# Patient Record
Sex: FEMALE | Race: WHITE | NOT HISPANIC OR LATINO | Employment: UNEMPLOYED | URBAN - METROPOLITAN AREA
[De-identification: names, ages, dates, MRNs, and addresses within clinical notes are randomized per-mention and may not be internally consistent; named-entity substitution may affect disease eponyms.]

---

## 2019-06-19 ENCOUNTER — OFFICE VISIT (OUTPATIENT)
Dept: FAMILY MEDICINE | Facility: CLINIC | Age: 59
End: 2019-06-19
Payer: COMMERCIAL

## 2019-06-19 VITALS
RESPIRATION RATE: 20 BRPM | BODY MASS INDEX: 29.32 KG/M2 | WEIGHT: 176 LBS | TEMPERATURE: 98.4 F | HEART RATE: 77 BPM | DIASTOLIC BLOOD PRESSURE: 82 MMHG | OXYGEN SATURATION: 97 % | SYSTOLIC BLOOD PRESSURE: 149 MMHG | HEIGHT: 65 IN

## 2019-06-19 DIAGNOSIS — L03.211 CELLULITIS, FACE: Primary | ICD-10-CM

## 2019-06-19 PROCEDURE — 99213 OFFICE O/P EST LOW 20 MIN: CPT | Performed by: FAMILY MEDICINE

## 2019-06-19 RX ORDER — CEPHALEXIN 500 MG/1
500 CAPSULE ORAL 2 TIMES DAILY
Qty: 20 CAPSULE | Refills: 0 | Status: SHIPPED | OUTPATIENT
Start: 2019-06-19

## 2019-06-19 SDOH — HEALTH STABILITY: MENTAL HEALTH: HOW OFTEN DO YOU HAVE A DRINK CONTAINING ALCOHOL?: NEVER

## 2019-06-19 ASSESSMENT — MIFFLIN-ST. JEOR: SCORE: 1369.21

## 2019-06-19 ASSESSMENT — PAIN SCALES - GENERAL: PAINLEVEL: SEVERE PAIN (7)

## 2019-06-19 NOTE — PROGRESS NOTES
"SUBJECTIVE:  Michelle Holguin is a 59 year old female who presents with right ear pain that started 2 day(s) ago.  The patient (or parent) described the pain or symptoms as moderate.  The patient (or parent) reports that in addition to the ear pain she has symptoms that include: 3 red spots on her head that are painful and a lump on her right neck.    The patient (or parent) denies a history of recurrent otitis.  The patient (or parent) reports that she has been swimming recently.   She was in the [facility] from the 12th to the 16th  The patient (or parent) denies that she has put Q-Tips into the ear canal recently.    She denies fevers, chills, nausea or vomiting     She has pain in the right side of her head     History reviewed. No pertinent past medical history.  No current outpatient medications on file.     Social History     Tobacco Use     Smoking status: Current Every Day Smoker     Smokeless tobacco: Never Used   Substance Use Topics     Alcohol use: Never     Frequency: Never           OBJECTIVE:  /82   Pulse 77   Temp 98.4  F (36.9  C) (Oral)   Resp 20   Ht 1.651 m (5' 5\")   Wt 79.8 kg (176 lb)   SpO2 97%   BMI 29.29 kg/m     EXAM:  The right TM is normal: no effusions, no erythema, and normal landmarks     The right auditory canal is normal and without drainage, edema or erythema    The left TM is normal: no effusions, no erythema, and normal landmarks  The left auditory canal is normal and without drainage, edema or erythema    Oropharynx exam is normal: no lesions, erythema, adenopathy or exudate.  GENERAL: no acute distress  EYES: EOMI,  PERRL, conjunctiva clear  SKIN: there were 3 areas or erythematous with a central scab. There were indurated warm and tender. The scab were about a cm in diameter and the surrounding erythematous another 1-2 cm   NECK: supple, non-tender to palpation, there was one palpable node on the right side of her neck    RESP: lungs clear to auscultation - no rales, " rhonchi or wheezes  CV: regular rates and rhythm, normal S1 S2, no murmur noted  SKIN: no suspicious lesions or rashes     ASSESSMENT:  Cellulitis of the face      PLAN:  Start the patient on oral keflex follow up in about a week  if not better and sooner if it worsens

## 2019-06-19 NOTE — NURSING NOTE
"Chief Complaint   Patient presents with     Ear Problem     pain, right. since Monday, few spots on face and scalp right side up toward the eye. sore     Health Maintenance     orders pended       Initial /82   Pulse 77   Temp 98.4  F (36.9  C) (Oral)   Resp 20   Ht 1.651 m (5' 5\")   Wt 79.8 kg (176 lb)   SpO2 97%   BMI 29.29 kg/m   Estimated body mass index is 29.29 kg/m  as calculated from the following:    Height as of this encounter: 1.651 m (5' 5\").    Weight as of this encounter: 79.8 kg (176 lb).  Medication Reconciliation: complete  Lolis Velazquez, BOB  "

## 2019-12-03 ENCOUNTER — TELEPHONE (OUTPATIENT)
Dept: FAMILY MEDICINE | Facility: CLINIC | Age: 59
End: 2019-12-03

## 2022-11-01 ENCOUNTER — TELEPHONE (OUTPATIENT)
Dept: FAMILY MEDICINE | Facility: CLINIC | Age: 62
End: 2022-11-01

## 2022-11-01 NOTE — TELEPHONE ENCOUNTER
Reason for Call:  Other appointment    Detailed comments: Patient would like an establish care appt with trevor shah and wants moles looked at, lump in groin area and has diabetes. Patient is available anytime after 11 to be seen.    Phone Number Patient can be reached at: Cell number on file:    Telephone Information:   Mobile [phone]       Best Time: Anytime    Can we leave a detailed message on this number? YES    Call taken on 11/1/2022 at 12:56 PM by Aminata Bose

## 2022-11-03 NOTE — TELEPHONE ENCOUNTER
Called pt. Scheduled appt 1-18 with dr shah however she will schedule with another provider elsewhere for the lump in her groin. Pt also states was just diagnosed with high blood pressure and diabetes but not given meds or told what to do. Adv pt she needs to follow up with that provider or we can see here here with another provider sooner. She will try to call other  Provider and get bp meds and type II meds. Adv pt she should not wait until appt with dr shah to address those items      Calvin Juarez RN

## 2024-01-01 ENCOUNTER — APPOINTMENT (OUTPATIENT)
Dept: GENERAL RADIOLOGY | Facility: CLINIC | Age: 64
End: 2024-01-01

## 2024-01-01 ENCOUNTER — HOSPITAL ENCOUNTER (OUTPATIENT)
Facility: CLINIC | Age: 64
End: 2024-01-01
Admitting: RADIOLOGY
Payer: COMMERCIAL

## 2024-01-01 ENCOUNTER — APPOINTMENT (OUTPATIENT)
Dept: CT IMAGING | Facility: CLINIC | Age: 64
End: 2024-01-01

## 2024-01-01 PROCEDURE — 71045 X-RAY EXAM CHEST 1 VIEW: CPT

## 2024-01-01 PROCEDURE — 70450 CT HEAD/BRAIN W/O DYE: CPT

## 2024-01-01 RX ORDER — NICOTINE POLACRILEX 4 MG
15-30 LOZENGE BUCCAL
Status: CANCELLED | OUTPATIENT
Start: 2024-01-01

## 2024-01-01 RX ORDER — LIDOCAINE 40 MG/G
CREAM TOPICAL
Status: CANCELLED | OUTPATIENT
Start: 2024-01-01

## 2024-01-01 RX ORDER — DEXTROSE MONOHYDRATE 25 G/50ML
25-50 INJECTION, SOLUTION INTRAVENOUS
Status: CANCELLED | OUTPATIENT
Start: 2024-01-01

## 2024-07-17 ENCOUNTER — LAB REQUISITION (OUTPATIENT)
Dept: LAB | Facility: CLINIC | Age: 64
End: 2024-07-17

## 2024-07-17 DIAGNOSIS — N76.4 ABSCESS OF VULVA: ICD-10-CM

## 2024-07-17 DIAGNOSIS — Z11.3 ENCOUNTER FOR SCREENING FOR INFECTIONS WITH A PREDOMINANTLY SEXUAL MODE OF TRANSMISSION: ICD-10-CM

## 2024-07-17 LAB
ERYTHROCYTE [DISTWIDTH] IN BLOOD BY AUTOMATED COUNT: 17.2 % (ref 10–15)
HBV SURFACE AG SERPL QL IA: NONREACTIVE
HCT VFR BLD AUTO: 35.1 % (ref 35–47)
HCV AB SERPL QL IA: NONREACTIVE
HGB BLD-MCNC: 11.1 G/DL (ref 11.7–15.7)
MCH RBC QN AUTO: 24.8 PG (ref 26.5–33)
MCHC RBC AUTO-ENTMCNC: 31.6 G/DL (ref 31.5–36.5)
MCV RBC AUTO: 78 FL (ref 78–100)
PLATELET # BLD AUTO: 288 10E3/UL (ref 150–450)
RBC # BLD AUTO: 4.48 10E6/UL (ref 3.8–5.2)
WBC # BLD AUTO: 9.4 10E3/UL (ref 4–11)

## 2024-07-17 PROCEDURE — 85027 COMPLETE CBC AUTOMATED: CPT | Performed by: OBSTETRICS & GYNECOLOGY

## 2024-07-17 PROCEDURE — 87340 HEPATITIS B SURFACE AG IA: CPT | Performed by: OBSTETRICS & GYNECOLOGY

## 2024-07-17 PROCEDURE — 86780 TREPONEMA PALLIDUM: CPT | Performed by: OBSTETRICS & GYNECOLOGY

## 2024-07-17 PROCEDURE — 87389 HIV-1 AG W/HIV-1&-2 AB AG IA: CPT | Performed by: OBSTETRICS & GYNECOLOGY

## 2024-07-17 PROCEDURE — 86803 HEPATITIS C AB TEST: CPT | Performed by: OBSTETRICS & GYNECOLOGY

## 2024-07-18 LAB
HIV 1+2 AB+HIV1 P24 AG SERPL QL IA: NONREACTIVE
T PALLIDUM AB SER QL: NONREACTIVE

## 2024-07-30 ENCOUNTER — TRANSFERRED RECORDS (OUTPATIENT)
Dept: HEALTH INFORMATION MANAGEMENT | Facility: CLINIC | Age: 64
End: 2024-07-30
Payer: COMMERCIAL

## 2024-08-07 ENCOUNTER — LAB REQUISITION (OUTPATIENT)
Dept: LAB | Facility: CLINIC | Age: 64
End: 2024-08-07

## 2024-08-07 DIAGNOSIS — R19.00 INTRA-ABDOMINAL AND PELVIC SWELLING, MASS AND LUMP, UNSPECIFIED SITE: ICD-10-CM

## 2024-08-07 PROCEDURE — 86304 IMMUNOASSAY TUMOR CA 125: CPT | Performed by: OBSTETRICS & GYNECOLOGY

## 2024-08-07 PROCEDURE — 82378 CARCINOEMBRYONIC ANTIGEN: CPT | Performed by: OBSTETRICS & GYNECOLOGY

## 2024-08-08 LAB
CANCER AG125 SERPL-ACNC: 12 U/ML
CEA SERPL-MCNC: 723 NG/ML

## 2024-08-22 ENCOUNTER — TRANSFERRED RECORDS (OUTPATIENT)
Dept: HEALTH INFORMATION MANAGEMENT | Facility: CLINIC | Age: 64
End: 2024-08-22
Payer: COMMERCIAL

## 2024-08-22 ENCOUNTER — MEDICAL CORRESPONDENCE (OUTPATIENT)
Dept: HEALTH INFORMATION MANAGEMENT | Facility: CLINIC | Age: 64
End: 2024-08-22
Payer: COMMERCIAL

## 2024-08-28 ENCOUNTER — TRANSCRIBE ORDERS (OUTPATIENT)
Dept: OTHER | Age: 64
End: 2024-08-28

## 2024-08-28 DIAGNOSIS — R19.00 PELVIC MASS: ICD-10-CM

## 2024-08-28 DIAGNOSIS — R97.0 ELEVATED CEA: Primary | ICD-10-CM

## 2024-08-29 ENCOUNTER — TRANSCRIBE ORDERS (OUTPATIENT)
Dept: OTHER | Age: 64
End: 2024-08-29

## 2024-08-29 ENCOUNTER — TELEPHONE (OUTPATIENT)
Dept: SURGERY | Facility: CLINIC | Age: 64
End: 2024-08-29
Payer: COMMERCIAL

## 2024-08-29 DIAGNOSIS — R19.00 PELVIC MASS: Primary | ICD-10-CM

## 2024-08-29 NOTE — TELEPHONE ENCOUNTER
LVM for pt informing them that the referral placed by Dr. Durand should go to [facility] per a staff message from Jil HUDSON RN. Left [facility] # for the pt to call back and schedule.

## 2024-08-30 ENCOUNTER — TELEPHONE (OUTPATIENT)
Dept: SURGERY | Facility: CLINIC | Age: 64
End: 2024-08-30
Payer: COMMERCIAL

## 2024-08-30 DIAGNOSIS — R19.00 PELVIC MASS: Primary | ICD-10-CM

## 2024-08-30 NOTE — TELEPHONE ENCOUNTER
[facility] Call Center    Phone Message    May a detailed message be left on voicemail: yes     Reason for Call: Appointment Intake    Referring Provider Name: Dagoberto Sam MD [facility]  Diagnosis and/or Symptoms: Pelvic Mass, elevated CEA     Action Taken: Message routed to:  [facility]    Travel Screening: Not Applicable     Date of Service:

## 2024-08-30 NOTE — TELEPHONE ENCOUNTER
Records Requested    Facility  [facility]  Fax: [phone]  [facility]  [facility]  Fax: [phone]   Outcome * 24 3:12 PM Faxed urgent request to [facility] and [facility] for records to be faxed to the clinic. - Dionna    [facility] - 24 - MRI Pelvis  24 - CT Chest/Abdomen  24 - CT Pelvis

## 2024-09-03 ENCOUNTER — HOSPITAL ENCOUNTER (OUTPATIENT)
Dept: GENERAL RADIOLOGY | Facility: CLINIC | Age: 64
Discharge: HOME | End: 2024-09-03
Attending: SURGERY
Payer: COMMERCIAL

## 2024-09-03 DIAGNOSIS — R19.00 PELVIC MASS: ICD-10-CM

## 2024-09-03 PROCEDURE — 72197 MRI PELVIS W/O & W/DYE: CPT | Mod: 26 | Performed by: RADIOLOGY

## 2024-09-03 PROCEDURE — 999N000122 MR MHEALTH OVERREAD

## 2024-09-03 NOTE — TELEPHONE ENCOUNTER
Diagnosis, Referred by & from: Pelvic Mass   Appt date: 2024   NOTES STATUS DETAILS   OFFICE NOTE from referring provider N/A [facility]: - No Records     OFFICE NOTE from other specialist Received / Care Everywhere [facility]:  24 - GYN ONC OV with Dr. Ladarius MEZA [facility]:  24 - OBGYN OV with Dr. Tyler    [facility]:  24 - PCC OV with REBECCA Mooneyina:  21 - PCC OV with Kellie Gonzalez NP   DISCHARGE SUMMARY from hospital N/A    DISCHARGE REPORT from the ER N/A    OPERATIVE REPORT N/A    MEDICATION LIST Care Everywhere    LABS     ANAL PAP/CEA [facility]:  24 - CEA   DIAGNOSTIC PROCEDURES N/A    IMAGING (DISC & REPORT)      CT Received [facility] - 24 - CT Chest/Abdomen  24 - CT Pelvis   MRI Received [facility] - 24 - MRI Pelvis     Records Requested  24    Facility  [facility]  Fax: [phone]  [facility]  Fax: [phone]   Outcome * 24 6:55 AM JOYCE received from [facility] and urgent request sent to [facility] for records to be faxed to the clinic. - Dionna    * 24 12:23 PM Records received from [facility] and sent to [facility] to be scanned into the chart. Sanchez Villareal

## 2024-09-03 NOTE — TELEPHONE ENCOUNTER
Patient was called after receiving a referral from Dr. Sam for pelvic mass- Patient has completed / Records are received: yes    CT scan: Yes                       MRI: Yes  PET scan: No       Blood work:Yes         Insurance Carrier: [employer]    Are images able/being pushed to our system? Yes    Colonoscopy Report: No         Pathology Report: No     Patient was offered a clinic appointment with Dr. Alvarado on 9/5. Patient is in agreement with this appointment date, time, and location. Patient's questions and concerns were addressed to Michelle's stated satisfaction. Patient is in agreement with this plan of care.

## 2024-09-03 NOTE — PROGRESS NOTES
[facility] Note    RE: Michelle Holguin.  : 1960.  AUGUSTIN: 2024.    Reason for visit: Pelvic mass. Concern for (multiple) malignancies.     HPI: Michelle is a 64-year old female who presents as a referral from Dr. Dagoberto Sam (he reviewed referral, but sent to [facility]) for workup of a pelvic mass seen on MRI. She met with gynecology in July for concern for vulvar cysts/abscesses. CT was completed with concern for ovarian malignancy, with possible pelvic involvement.     Work up to date:   Pelvis CT (24)  IMPRESSION:   1.  Abnormal bilobed peripherally enhancing and centrally necrotic appearing lesion visible in the false and true pelvis. This is inseparable from the cecum raising the possibility of a colon/appendiceal primary. It is inseparable from the left ovary [right ovary appears normal by CT] raising the possibility of ovarian primary. It is also inseparable from the uterine fundus. This likely represents uterine invasion as primary myometrial malignancy is uncommon.   2.  Several necrotic lymph nodes in the right abdominal mesentery extending to the superior aspect of the exam. These are not fully interrogated on this CT pelvis.   3.  Thickening of the round ligaments bilaterally in the inguinal canals extending to the superior labia majora bilaterally. The right round ligament is inseparable from the abnormally thickened right aspect of urinary bladder. Possibility of a bladder lesion is not excluded based on this CT. There is a coarse calcification along the right aspect of the urinary bladder which does not appear to be intraluminal.   4.  Small volume ascites is low-density.   5.  Left and sigmoid colonic diverticulosis without findings to suggest focal acute diverticulitis.   6.  I discussed the findings with Dr. Tyler via telephone at approximately 2:20 p.m. date of exam.   CEA (24): 723  : 12  She met with [facility] Dr. Lanie Durand (Gyn Oncologist)  who referred  her to Dr. Sam for the cecal/appendiceal involvement on imaging.   CT CAP (8/24/24)  1.  No evidence of thoracic or upper abdominal metastatic disease.   2.  No evidence of an acute thoracic abnormality.   3.  Stable 3 mm noncalcified right lower lobe pulmonary nodule.   MR Pelvis (8/23/24 with [facility]):  IMPRESSION:   1. Redemonstrated large irregular thick walled mass lesion in the pelvis with surrounding adenopathy and adjacent probably necrotic soft tissue nodules or lymph nodes. This appears to be separate from the ovaries on the current examination. Question colonic origin including possible lymphoma. There is no proximal obstruction visualized on the current exam. Small volume adjacent loculated ascites in the low pelvis and right adnexal region. The mass lesion does abut and cause mass effect on the fundal portion of the myometrium, cannot exclude myometrial invasion.   2. Additional 1.5 cm enhancing nodule lateral to the left sciatic notch, stable from CT examination, technically indeterminate. Possible node or vascular anomaly. Correlation with any prior outside imaging of the pelvis is recommended to evaluate for presence.   3. Extensive generally chronic appearing diverticulosis of the sigmoid colon.   4. Suspected prior operative changes of bladder sling with adjacent small fat-containing inguinal hernias and likely postprocedural chronic calcification along the rightward aspect of the urinary bladder wall.   5.. Incidentally noted thickened endometrial contents for a presumed postmenopausal patient measuring up to 9 mm.     [facility] MRI overread in process       Baseline bowel habits: is having constipation but still passing gas and stool    Last colonoscopy: Never    Medical history:  DM  HLD  Tobacco abuse (current)     Surgical history:  Bladder sling previously    Family history:  No Hx of IBD or GI malignancy    Medications:  Current Outpatient Medications   Medication Sig Dispense Refill     cephALEXin (KEFLEX) 500 MG capsule Take 1 capsule (500 mg) by mouth 2 times daily 20 capsule 0     - Atorvastatin 40mg daily  -Glimepride 4mg daily      Allergies:  Allergies   Allergen Reactions    Codeine GI Disturbance    Nickel Rash       Social history:   Social History     Tobacco Use    Smoking status: Every Day    Smokeless tobacco: Never   Substance Use Topics    Alcohol use: Never     Marital status: .    ROS:  A complete review of systems was performed with the patient and all systems negative except as per HPI.    Physical Examination:  /75 (BP Location: Left arm, Patient Position: Sitting, Cuff Size: Adult Regular)   Pulse 100   SpO2 97%   General: Well hydrated. No acute distress.  Abdomen: Soft, NT, distended, firm palpable mass in the lower abdomen. No inguinal adenopathy palpated.  Perianal external examination:  deferred    Laboratory values reviewed:  Recent Labs   Lab Test 07/17/24  1503   WBC 9.4   HGB 11.1*          Imaging personally reviewed by me:  Pelvis CT (7/30/24)  IMPRESSION:   1.  Abnormal bilobed peripherally enhancing and centrally necrotic appearing lesion visible in the false and true pelvis. This is inseparable from the cecum raising the possibility of a colon/appendiceal primary. It is inseparable from the left ovary [right ovary appears normal by CT] raising the possibility of ovarian primary. It is also inseparable from the uterine fundus. This likely represents uterine invasion as primary myometrial malignancy is uncommon.   2.  Several necrotic lymph nodes in the right abdominal mesentery extending to the superior aspect of the exam. These are not fully interrogated on this CT pelvis.   3.  Thickening of the round ligaments bilaterally in the inguinal canals extending to the superior labia majora bilaterally. The right round ligament is inseparable from the abnormally thickened right aspect of urinary bladder. Possibility of a bladder lesion is not  excluded based on this CT. There is a coarse calcification along the right aspect of the urinary bladder which does not appear to be intraluminal.   4.  Small volume ascites is low-density.   5.  Left and sigmoid colonic diverticulosis without findings to suggest focal acute diverticulitis.   6.  I discussed the findings with Dr. Tyler via telephone at approximately 2:20 p.m. date of exam.   CT CAP (8/24/24)  1.  No evidence of thoracic or upper abdominal metastatic disease.   2.  No evidence of an acute thoracic abnormality.   3.  Stable 3 mm noncalcified right lower lobe pulmonary nodule.   MR Pelvis (8/23/24 with [facility]):  IMPRESSION:   1. Redemonstrated large irregular thick walled mass lesion in the pelvis with surrounding adenopathy and adjacent probably necrotic soft tissue nodules or lymph nodes. This appears to be separate from the ovaries on the current examination. Question colonic origin including possible lymphoma. There is no proximal obstruction visualized on the current exam. Small volume adjacent loculated ascites in the low pelvis and right adnexal region. The mass lesion does abut and cause mass effect on the fundal portion of the myometrium, cannot exclude myometrial invasion.   2. Additional 1.5 cm enhancing nodule lateral to the left sciatic notch, stable from CT examination, technically indeterminate. Possible node or vascular anomaly. Correlation with any prior outside imaging of the pelvis is recommended to evaluate for presence.   3. Extensive generally chronic appearing diverticulosis of the sigmoid colon.   4. Suspected prior operative changes of bladder sling with adjacent small fat-containing inguinal hernias and likely postprocedural chronic calcification along the rightward aspect of the urinary bladder wall.   5.. Incidentally noted thickened endometrial contents for a presumed postmenopausal patient measuring up to 9 mm.     ASSESSMENT  1. Pelvic mass concerning for  malignancy.  2. HLD  3. Current smoker, 0.5 ppd  4. DM     PLAN  1. I am concerned that we will not tolerated a colonoscopy, and the size of the tumor will make a colonoscopy challenging if not possible to be performed.  2. Present at tumor board to review imaging. Obtain images.  3. Given its large size, I favor neoadjuvant chemotherapy. Given the markedly elevated CEA, I strongly suspect colonic origin. Neoadjuvant treatment will allow it to shrink in size increasing the likelihood of achieving an R0 resection.  4. Must quit smoking, discussed this at length.    Risks, benefits, and alternatives of operative treatment were thoroughly discussed with the patient, she understands these well and agrees to proceed.    Time spent: 60 minutes, >50% spent in discussing, counseling and coordinating care.    Stevie Alvarado M.D    Division of Colon and Rectal Surgery  [facility]    Referring Provider:  Dagoberto Sam MD  [facility]  [id number] [address] [address]     Primary Care Provider:  [facility]

## 2024-09-05 ENCOUNTER — PRE VISIT (OUTPATIENT)
Dept: SURGERY | Facility: CLINIC | Age: 64
End: 2024-09-05

## 2024-09-05 ENCOUNTER — OFFICE VISIT (OUTPATIENT)
Dept: SURGERY | Facility: CLINIC | Age: 64
End: 2024-09-05
Payer: COMMERCIAL

## 2024-09-05 VITALS — OXYGEN SATURATION: 97 % | SYSTOLIC BLOOD PRESSURE: 121 MMHG | HEART RATE: 100 BPM | DIASTOLIC BLOOD PRESSURE: 75 MMHG

## 2024-09-05 DIAGNOSIS — R19.00 PELVIC MASS: ICD-10-CM

## 2024-09-05 PROCEDURE — 99205 OFFICE O/P NEW HI 60 MIN: CPT | Performed by: SURGERY

## 2024-09-05 RX ORDER — GLIMEPIRIDE 4 MG/1
4 TABLET ORAL
COMMUNITY
Start: 2024-02-19

## 2024-09-05 RX ORDER — ATORVASTATIN CALCIUM 40 MG/1
40 TABLET, FILM COATED ORAL DAILY
COMMUNITY
Start: 2024-03-28

## 2024-09-05 ASSESSMENT — PAIN SCALES - GENERAL: PAINLEVEL: MODERATE PAIN (4)

## 2024-09-05 NOTE — NURSING NOTE
Chief Complaint   Patient presents with    Cancer     Pelvic Mass       Vitals:    09/05/24 0853   BP: 121/75   BP Location: Left arm   Patient Position: Sitting   Cuff Size: Adult Regular   Pulse: 100   SpO2: 97%       There is no height or weight on file to calculate BMI.    Kadi Dockery, CMA

## 2024-09-05 NOTE — LETTER
2024       RE: Michelle Holguin  [address] [facility] [address]     Dear Colleague,    Thank you for referring your patient, Michelle Holguin, to the [facility] COLON AND RECTAL SURGERY CLINIC [place] at [facility]. Please see a copy of my visit note below.    Colon and Rectal Surgery Clinic Note    RE: Michelle Holguin.  : 1960.  AUGUSTIN: 2024.    Reason for visit: Pelvic mass. Concern for (multiple) malignancies.     HPI: Michelle is a 64-year old female who presents as a referral from Dr. Dagoberto Sam (he reviewed referral, but sent to [facility]) for workup of a pelvic mass seen on MRI. She met with gynecology in July for concern for vulvar cysts/abscesses. CT was completed with concern for ovarian malignancy, with possible pelvic involvement.     Work up to date:   Pelvis CT (24)  IMPRESSION:   1.  Abnormal bilobed peripherally enhancing and centrally necrotic appearing lesion visible in the false and true pelvis. This is inseparable from the cecum raising the possibility of a colon/appendiceal primary. It is inseparable from the left ovary [right ovary appears normal by CT] raising the possibility of ovarian primary. It is also inseparable from the uterine fundus. This likely represents uterine invasion as primary myometrial malignancy is uncommon.   2.  Several necrotic lymph nodes in the right abdominal mesentery extending to the superior aspect of the exam. These are not fully interrogated on this CT pelvis.   3.  Thickening of the round ligaments bilaterally in the inguinal canals extending to the superior labia majora bilaterally. The right round ligament is inseparable from the abnormally thickened right aspect of urinary bladder. Possibility of a bladder lesion is not excluded based on this CT. There is a coarse calcification along the right aspect of the urinary bladder which does not appear to be intraluminal.   4.  Small volume ascites  is low-density.   5.  Left and sigmoid colonic diverticulosis without findings to suggest focal acute diverticulitis.   6.  I discussed the findings with Dr. Tyler via telephone at approximately 2:20 p.m. date of exam.   CEA (8/7/24): 723  : 12  She met with [facility] Dr. Lanie Durand (Gyn Oncologist)  who referred her to Dr. Sam for the cecal/appendiceal involvement on imaging.   CT CAP (8/24/24)  1.  No evidence of thoracic or upper abdominal metastatic disease.   2.  No evidence of an acute thoracic abnormality.   3.  Stable 3 mm noncalcified right lower lobe pulmonary nodule.   MR Pelvis (8/23/24 with [facility]):  IMPRESSION:   1. Redemonstrated large irregular thick walled mass lesion in the pelvis with surrounding adenopathy and adjacent probably necrotic soft tissue nodules or lymph nodes. This appears to be separate from the ovaries on the current examination. Question colonic origin including possible lymphoma. There is no proximal obstruction visualized on the current exam. Small volume adjacent loculated ascites in the low pelvis and right adnexal region. The mass lesion does abut and cause mass effect on the fundal portion of the myometrium, cannot exclude myometrial invasion.   2. Additional 1.5 cm enhancing nodule lateral to the left sciatic notch, stable from CT examination, technically indeterminate. Possible node or vascular anomaly. Correlation with any prior outside imaging of the pelvis is recommended to evaluate for presence.   3. Extensive generally chronic appearing diverticulosis of the sigmoid colon.   4. Suspected prior operative changes of bladder sling with adjacent small fat-containing inguinal hernias and likely postprocedural chronic calcification along the rightward aspect of the urinary bladder wall.   5.. Incidentally noted thickened endometrial contents for a presumed postmenopausal patient measuring up to 9 mm.     [facility] MRI overread in process       Baseline bowel  habits: is having constipation but still passing gas and stool    Last colonoscopy: Never    Medical history:  DM  HLD  Tobacco abuse (current)     Surgical history:  Bladder sling previously    Family history:  No Hx of IBD or GI malignancy    Medications:  Current Outpatient Medications   Medication Sig Dispense Refill     cephALEXin (KEFLEX) 500 MG capsule Take 1 capsule (500 mg) by mouth 2 times daily 20 capsule 0     - Atorvastatin 40mg daily  -Glimepride 4mg daily      Allergies:  Allergies   Allergen Reactions     Codeine GI Disturbance     Nickel Rash       Social history:   Social History     Tobacco Use     Smoking status: Every Day     Smokeless tobacco: Never   Substance Use Topics     Alcohol use: Never     Marital status: .    ROS:  A complete review of systems was performed with the patient and all systems negative except as per HPI.    Physical Examination:  /75 (BP Location: Left arm, Patient Position: Sitting, Cuff Size: Adult Regular)   Pulse 100   SpO2 97%   General: Well hydrated. No acute distress.  Abdomen: Soft, NT, distended, firm palpable mass in the lower abdomen. No inguinal adenopathy palpated.  Perianal external examination:  deferred    Laboratory values reviewed:  Recent Labs   Lab Test 07/17/24  1503   WBC 9.4   HGB 11.1*          Imaging personally reviewed by me:  Pelvis CT (7/30/24)  IMPRESSION:   1.  Abnormal bilobed peripherally enhancing and centrally necrotic appearing lesion visible in the false and true pelvis. This is inseparable from the cecum raising the possibility of a colon/appendiceal primary. It is inseparable from the left ovary [right ovary appears normal by CT] raising the possibility of ovarian primary. It is also inseparable from the uterine fundus. This likely represents uterine invasion as primary myometrial malignancy is uncommon.   2.  Several necrotic lymph nodes in the right abdominal mesentery extending to the superior aspect of the  exam. These are not fully interrogated on this CT pelvis.   3.  Thickening of the round ligaments bilaterally in the inguinal canals extending to the superior labia majora bilaterally. The right round ligament is inseparable from the abnormally thickened right aspect of urinary bladder. Possibility of a bladder lesion is not excluded based on this CT. There is a coarse calcification along the right aspect of the urinary bladder which does not appear to be intraluminal.   4.  Small volume ascites is low-density.   5.  Left and sigmoid colonic diverticulosis without findings to suggest focal acute diverticulitis.   6.  I discussed the findings with Dr. Tyler via telephone at approximately 2:20 p.m. date of exam.   CT CAP (8/24/24)  1.  No evidence of thoracic or upper abdominal metastatic disease.   2.  No evidence of an acute thoracic abnormality.   3.  Stable 3 mm noncalcified right lower lobe pulmonary nodule.   MR Pelvis (8/23/24 with [facility]):  IMPRESSION:   1. Redemonstrated large irregular thick walled mass lesion in the pelvis with surrounding adenopathy and adjacent probably necrotic soft tissue nodules or lymph nodes. This appears to be separate from the ovaries on the current examination. Question colonic origin including possible lymphoma. There is no proximal obstruction visualized on the current exam. Small volume adjacent loculated ascites in the low pelvis and right adnexal region. The mass lesion does abut and cause mass effect on the fundal portion of the myometrium, cannot exclude myometrial invasion.   2. Additional 1.5 cm enhancing nodule lateral to the left sciatic notch, stable from CT examination, technically indeterminate. Possible node or vascular anomaly. Correlation with any prior outside imaging of the pelvis is recommended to evaluate for presence.   3. Extensive generally chronic appearing diverticulosis of the sigmoid colon.   4. Suspected prior operative changes of bladder  sling with adjacent small fat-containing inguinal hernias and likely postprocedural chronic calcification along the rightward aspect of the urinary bladder wall.   5.. Incidentally noted thickened endometrial contents for a presumed postmenopausal patient measuring up to 9 mm.     ASSESSMENT  1. Pelvic mass concerning for malignancy.  2. HLD  3. Current smoker, 0.5 ppd  4. DM     PLAN  1. I am concerned that we will not tolerated a colonoscopy, and the size of the tumor will make a colonoscopy challenging if not possible to be performed.  2. Present at tumor board to review imaging. Obtain images.  3. Given its large size, I favor neoadjuvant chemotherapy. Given the markedly elevated CEA, I strongly suspect colonic origin. Neoadjuvant treatment will allow it to shrink in size increasing the likelihood of achieving an R0 resection.  4. Must quit smoking, discussed this at length.    Risks, benefits, and alternatives of operative treatment were thoroughly discussed with the patient, she understands these well and agrees to proceed.    Time spent: 60 minutes, >50% spent in discussing, counseling and coordinating care.    Stevie Alvarado M.D    Division of Colon and Rectal Surgery  [facility]    Referring Provider:  Dagoberto Sam MD  [facility]  [address] [place] [address]     Primary Care Provider:  [facility]      Again, thank you for allowing me to participate in the care of your patient.      Sincerely,    Stevie Alvarado MD, MD

## 2024-09-09 ENCOUNTER — TUMOR CONFERENCE (OUTPATIENT)
Dept: ONCOLOGY | Facility: CLINIC | Age: 64
End: 2024-09-09
Payer: COMMERCIAL

## 2024-09-09 DIAGNOSIS — R19.00 PELVIC MASS: Primary | ICD-10-CM

## 2024-09-09 NOTE — CONSULTS
"    Outpatient IR Referral  09/09/24    Referring Provider: Dr. Alvarado  IR Referral Request: Left gluteal mass biopsy   Recommendations/Plan:  US guided left gluteal mass biopsy.  See CT 8/23/24 series 12 image 61  Surg path entered under referrer.    Case and imaging was reviewed with Dr. Vishal Doherty MD and Dr. Huang.  IR recommendations also relayed Epic messaging.    IR referral converted to procedure order.      Brief History:    Michelle Holguin is a 64 year old female with history of DM, HLD, tobacco use, painful valvular cyst x 2 yrs, large pelvic mass no confirmed cancer diagnosis.     Pertinent Medications:    Current Outpatient Medications   Medication Sig Dispense Refill    atorvastatin (LIPITOR) 40 MG tablet       cephALEXin (KEFLEX) 500 MG capsule Take 1 capsule (500 mg) by mouth 2 times daily 20 capsule 0    glimepiride (AMARYL) 4 MG tablet Take 4 mg by mouth every morning (before breakfast).       No current facility-administered medications for this visit.       Pertinent Imaging Reviewed:  CT 8/23/24.  MRI 7/30/24          Most Recent Labs:  Lab Results   Component Value Date    WBC 9.4 07/17/2024     Lab Results   Component Value Date    RBC 4.48 07/17/2024     Lab Results   Component Value Date    HGB 11.1 07/17/2024     Lab Results   Component Value Date    HCT 35.1 07/17/2024     Lab Results   Component Value Date     07/17/2024    Last Comprehensive Metabolic Panel:  No results found for: \"NA\", \"POTASSIUM\", \"CHLORIDE\", \"CO2\", \"ANIONGAP\", \"GLC\", \"BUN\", \"CR\", \"GFRESTIMATED\", \"SHOBHA\"   No results found for: \"INR\"       If requesting team would like sample sent for anything else please use the IR order set \"IR RAD Biopsy or Fluid Aspirate Specimens\" to select your necessary diagnostic labs and pend for admission.     If there are labs you desire that are not found in this order set or you have questions regarding specific diagnostic labs please call the associated lab personnel. "       DAMARIS López CNP  Interventional Radiology   [phone] (IR RN triage)

## 2024-09-09 NOTE — TUMOR CONFERENCE
Tumor Conference Information  Tumor Conference: Colorectal  Specialties Present: Medical oncology, Radiation oncology, Pathology, Radiology, Surgery  Patient Status: Retrospective  Stage: Pelvic mass.  Treatment to Date: None  Clinical Trials: Not discussed  Genetic Testing Discussed/Recommended?: No  Supportive Care Services Discussed/Recommended?: No  Recommended Plan: Follows evidence-based guidelines (Comment: diagnostic lap, diversion.)  Did the review exceed 30 minutes?: did not           Documentation / Disclaimer Cancer Tumor Board Note  Cancer tumor board recommendations do not override what is determined to be reasonable care and treatment, which is dependent on the circumstances of a patient's case; the patient's medical, social, and personal concerns; and the clinical judgment of the oncologist [physician].

## 2024-09-24 ENCOUNTER — TELEPHONE (OUTPATIENT)
Dept: INTERVENTIONAL RADIOLOGY/VASCULAR | Facility: CLINIC | Age: 64
End: 2024-09-24
Payer: COMMERCIAL

## 2024-09-24 NOTE — TELEPHONE ENCOUNTER
Writer has spoken with Michelle regarding planned procedure with IR via telephone.      Michelle acknowledges understanding of pre-procedure instructions.         I have provided Michelle with IR number ([phone]) for questions or concerns.    Azul BROWN  Interventional Radiology RN   [phone]

## 2024-09-24 NOTE — TELEPHONE ENCOUNTER
INTERVENTIONAL RADIOLOGY INSTRUCTIONS     You are scheduled for an upcoming procedure in the   Interventional Radiology Department at [facility].     Date: Friday September 27      Procedure: Biopsy     Address: [facility]                 [address] [place] [address]         There is  parking for patients with limited mobility located at the front entrance of [facility].    [facility] Patient/Visitor Parking Ramp   is located on the corner of [address]:   [address] [place] [address] [place].                                                                                   [facility] [address]                                                                                                         Check into the [facility] Waiting Room at: 09:30 am    On the date of procedure, please do not eat any food after: 01:30 am (8 hours before arrival time)    On the date of this procedure, you may drink clear liquids until 07:30 am (2 hours before arrival time)    Clear liquid examples are: water, apple juice, black coffee or tea (no cream, sugar or milk), Gatorade & Jello.      You will need to have someone available to drive you home.     A ride share service ([employer], [employer], [employer]) does not qualify for transportation home unless you have another adult accompanying you home.    We recommend that you do not not drive, or operate heavy machinery for 24 hours after receiving sedation for this procedure.    We recommend that you have someone stay with you 1-2 hours after you get home.    Please take all of your medications as prescribed on the day of this procedure, unless you are contacted by a nurse from our department to hold:    Michelle -  please DO NOT take your prescribed glimepiride (AMARYL) 4 MG tablet on the morning of this procedure.    Please confirm that you have received  these instructions by replying to this [employer] message, or if you have any questions, please call the Interventional Radiology team at [phone].         Thank you!    Azul BROWN  Interventional Radiology Intake Nurse Coordinator  [phone]

## 2024-09-27 ENCOUNTER — HOSPITAL ENCOUNTER (OUTPATIENT)
Facility: CLINIC | Age: 64
Discharge: HOME | End: 2024-09-27
Attending: SURGERY | Admitting: SURGERY
Payer: COMMERCIAL

## 2024-09-27 ENCOUNTER — APPOINTMENT (OUTPATIENT)
Dept: INTERVENTIONAL RADIOLOGY/VASCULAR | Facility: CLINIC | Age: 64
End: 2024-09-27
Attending: SURGERY
Payer: COMMERCIAL

## 2024-09-27 ENCOUNTER — APPOINTMENT (OUTPATIENT)
Dept: MEDSURG UNIT | Facility: CLINIC | Age: 64
End: 2024-09-27
Attending: SURGERY
Payer: COMMERCIAL

## 2024-09-27 VITALS
RESPIRATION RATE: 18 BRPM | TEMPERATURE: 98.6 F | DIASTOLIC BLOOD PRESSURE: 65 MMHG | OXYGEN SATURATION: 100 % | BODY MASS INDEX: 24.93 KG/M2 | SYSTOLIC BLOOD PRESSURE: 114 MMHG | WEIGHT: 149.8 LBS | HEART RATE: 93 BPM

## 2024-09-27 DIAGNOSIS — R19.00 PELVIC MASS: ICD-10-CM

## 2024-09-27 LAB
ERYTHROCYTE [DISTWIDTH] IN BLOOD BY AUTOMATED COUNT: 15.7 % (ref 10–15)
HCT VFR BLD AUTO: 30.5 % (ref 35–47)
HGB BLD-MCNC: 9.2 G/DL (ref 11.7–15.7)
INR PPP: 1.05 (ref 0.85–1.15)
MCH RBC QN AUTO: 23.1 PG (ref 26.5–33)
MCHC RBC AUTO-ENTMCNC: 30.2 G/DL (ref 31.5–36.5)
MCV RBC AUTO: 77 FL (ref 78–100)
PLATELET # BLD AUTO: 441 10E3/UL (ref 150–450)
RBC # BLD AUTO: 3.98 10E6/UL (ref 3.8–5.2)
WBC # BLD AUTO: 12.2 10E3/UL (ref 4–11)

## 2024-09-27 PROCEDURE — 85027 COMPLETE CBC AUTOMATED: CPT | Performed by: SURGERY

## 2024-09-27 PROCEDURE — 88305 TISSUE EXAM BY PATHOLOGIST: CPT | Mod: 26 | Performed by: PATHOLOGY

## 2024-09-27 PROCEDURE — 999N000142 HC STATISTIC PROCEDURE PREP ONLY

## 2024-09-27 PROCEDURE — 88305 TISSUE EXAM BY PATHOLOGIST: CPT | Mod: TC | Performed by: SURGERY

## 2024-09-27 PROCEDURE — 88342 IMHCHEM/IMCYTCHM 1ST ANTB: CPT | Mod: 26 | Performed by: PATHOLOGY

## 2024-09-27 PROCEDURE — 85610 PROTHROMBIN TIME: CPT | Performed by: SURGERY

## 2024-09-27 PROCEDURE — 88341 IMHCHEM/IMCYTCHM EA ADD ANTB: CPT | Mod: 26 | Performed by: PATHOLOGY

## 2024-09-27 PROCEDURE — 36415 COLL VENOUS BLD VENIPUNCTURE: CPT | Performed by: SURGERY

## 2024-09-27 PROCEDURE — 250N000009 HC RX 250: Performed by: SURGERY

## 2024-09-27 PROCEDURE — 76942 ECHO GUIDE FOR BIOPSY: CPT | Mod: 26 | Performed by: PHYSICIAN ASSISTANT

## 2024-09-27 PROCEDURE — 99152 MOD SED SAME PHYS/QHP 5/>YRS: CPT | Performed by: PHYSICIAN ASSISTANT

## 2024-09-27 PROCEDURE — 88341 IMHCHEM/IMCYTCHM EA ADD ANTB: CPT | Mod: TC | Performed by: SURGERY

## 2024-09-27 PROCEDURE — 250N000011 HC RX IP 250 OP 636: Performed by: SURGERY

## 2024-09-27 PROCEDURE — 999N000133 HC STATISTIC PP CARE STAGE 2

## 2024-09-27 PROCEDURE — 258N000003 HC RX IP 258 OP 636: Performed by: SURGERY

## 2024-09-27 PROCEDURE — 20206 BIOPSY MUSCLE PERQ NEEDLE: CPT | Performed by: PHYSICIAN ASSISTANT

## 2024-09-27 PROCEDURE — 99152 MOD SED SAME PHYS/QHP 5/>YRS: CPT

## 2024-09-27 RX ORDER — NALOXONE HYDROCHLORIDE 0.4 MG/ML
0.4 INJECTION, SOLUTION INTRAMUSCULAR; INTRAVENOUS; SUBCUTANEOUS
Status: DISCONTINUED | OUTPATIENT
Start: 2024-09-27 | End: 2024-09-27 | Stop reason: HOSPADM

## 2024-09-27 RX ORDER — FENTANYL CITRATE 50 UG/ML
25-50 INJECTION, SOLUTION INTRAMUSCULAR; INTRAVENOUS EVERY 5 MIN PRN
Status: DISCONTINUED | OUTPATIENT
Start: 2024-09-27 | End: 2024-09-27 | Stop reason: HOSPADM

## 2024-09-27 RX ORDER — FLUMAZENIL 0.1 MG/ML
0.2 INJECTION, SOLUTION INTRAVENOUS
Status: DISCONTINUED | OUTPATIENT
Start: 2024-09-27 | End: 2024-09-27 | Stop reason: HOSPADM

## 2024-09-27 RX ORDER — NALOXONE HYDROCHLORIDE 0.4 MG/ML
0.2 INJECTION, SOLUTION INTRAMUSCULAR; INTRAVENOUS; SUBCUTANEOUS
Status: DISCONTINUED | OUTPATIENT
Start: 2024-09-27 | End: 2024-09-27 | Stop reason: HOSPADM

## 2024-09-27 RX ORDER — SODIUM CHLORIDE 9 MG/ML
INJECTION, SOLUTION INTRAVENOUS CONTINUOUS
Status: DISCONTINUED | OUTPATIENT
Start: 2024-09-27 | End: 2024-09-27 | Stop reason: HOSPADM

## 2024-09-27 RX ORDER — LIDOCAINE 40 MG/G
CREAM TOPICAL
Status: DISCONTINUED | OUTPATIENT
Start: 2024-09-27 | End: 2024-09-27 | Stop reason: HOSPADM

## 2024-09-27 RX ADMIN — SODIUM CHLORIDE: 9 INJECTION, SOLUTION INTRAVENOUS at 10:19

## 2024-09-27 RX ADMIN — LIDOCAINE HYDROCHLORIDE 10 ML: 10 INJECTION, SOLUTION EPIDURAL; INFILTRATION; INTRACAUDAL; PERINEURAL at 11:44

## 2024-09-27 RX ADMIN — MIDAZOLAM 1 MG: 1 INJECTION INTRAMUSCULAR; INTRAVENOUS at 11:23

## 2024-09-27 RX ADMIN — FENTANYL CITRATE 50 MCG: 50 INJECTION, SOLUTION INTRAMUSCULAR; INTRAVENOUS at 11:29

## 2024-09-27 RX ADMIN — MIDAZOLAM 1 MG: 1 INJECTION INTRAMUSCULAR; INTRAVENOUS at 11:29

## 2024-09-27 RX ADMIN — FENTANYL CITRATE 50 MCG: 50 INJECTION, SOLUTION INTRAMUSCULAR; INTRAVENOUS at 11:23

## 2024-09-27 ASSESSMENT — ACTIVITIES OF DAILY LIVING (ADL)
ADLS_ACUITY_SCORE: 35

## 2024-09-27 NOTE — PROGRESS NOTES
Pt tolerated recovery without complication. Discharge instructions reviewed, copy given to pt. Pt tolerated oral intake and ambulation. PIV yoel'michelle. Pt discharged home accompanied by friend.

## 2024-09-27 NOTE — DISCHARGE INSTRUCTIONS
[facility]    Interventional Radiology  Patient Instructions Following Soft Tissue Biopsy    AFTER YOU GO HOME  If you were given sedation, for the first 24 hours: we recommend that an adult stay with you, DO NOT drive or operate machinery at home or at work, DO NOT smoke, DO NOT make any important or legal decisions.  DO NOT drink alcoholic beverages the day of your procedure  Drink plenty of fluids   Resume your regular diet, unless otherwise instructed by your Primary Physician  Relax and take it easy for 48 hours  DO NOT do any strenuous exercise or lifting (> 10 lbs) for at least 3 days following your procedure  Keep the dressing dry and in place for 24 hours. Replace with Band aid for 2 days.  Never leave a wet dressing in place.  Do not take a shower for at least 12 hours following your procedure. No tub bath, hot tub, or swimming for 5 days.  There should be minimum drainage from the biopsy site    CALL THE PHYSICIAN IF:  You start bleeding from the procedure site.  If you do start to bleed from that site, hold pressure on the site for a minimum of 10 minutes.  Your physician will tell you if you need to return to the hospital  You develop nausea or vomiting  You have excessive swelling, redness, or tenderness at the site  You have drainage that looks like it is infected.  You experience severe pain  You develop hives or a rash or unexplained itching  You develop a temperature of 101 degrees F or greater    ADDITIONAL INSTRUCTIONS: None    [facility] INTERVENTIONAL RADIOLOGY DEPARTMENT  Procedure Physician: Simone Latham PA-C    Date of procedure: September 27, 2024  Telephone Numbers: [phone]      Monday-Friday 7:30 am to 4:00 pm  [phone]    After 4:00 pm Monday-Friday, Weekends & Holidays. Ask for the Interventional Radiologist on call.  Someone is on call 24 hrs/day  [facility] toll free number: [phone] Monday-Friday 8:00 am to 4:30 pm  [facility] Emergency Dept: [phone]

## 2024-09-27 NOTE — PROCEDURES
[facility]    Procedure: IR Procedure Note    Date/Time: 9/27/2024 11:39 AM    Performed by: Simone Latham PA-C  Authorized by: Simone Latham PA-C  IR Fellow Physician:    Pre Procedure Diagnosis: Malignancy  Post Procedure Diagnosis: Malignancy    UNIVERSAL PROTOCOL   Site Marked: NA  Prior Images Obtained and Reviewed:  Yes  Required items: Required blood products, implants, devices and special equipment available    Patient identity confirmed:  Verbally with patient, arm band, provided demographic data and hospital-assigned identification number  Patient was reevaluated immediately before administering moderate or deep sedation or anesthesia  Confirmation Checklist:  Patient's identity using two indicators, relevant allergies, procedure was appropriate and matched the consent or emergent situation and correct equipment/implants were available  Time out: Immediately prior to the procedure a time out was called    Universal Protocol: the Joint Commission Universal Protocol was followed    Preparation: Patient was prepped and draped in usual sterile fashion       ANESTHESIA    Anesthesia:  Local infiltration  Local Anesthetic:  Lidocaine 1% without epinephrine  Anesthetic Total (mL):  8      SEDATION  Patient Sedated: Yes    Sedation Type:  Moderate (conscious) sedation  Sedation:  Fentanyl and midazolam  Vital signs: Vital signs monitored during sedation    Fluoroscopy Time: 0 minute(s)  See dictated procedure note for full details.  Findings: Moderate sedation for soft tissue biopsy - 2 mg of midazolam and 100 mcg of fentanyl given over 10 minutes. Patient tolerated procedure.     Specimens: core needle biopsy specimens sent for pathological analysis    Procedural Complications: None    Condition: Stable    Plan: 1 hour bedrest      PROCEDURE  Describe Procedure: Completed ultrasound-guided soft tissue lesion biopsy. 3 passes yielded 3 cores sent to  pathology in preservative. Gelfoam in tract. No immediate complication.      Patient Tolerance:  Patient tolerated the procedure well with no immediate complications  Length of time physician/provider present for 1:1 monitoring during sedation:  0-22 min

## 2024-09-27 NOTE — IR NOTE
Patient Name: Michelle Holguin  Medical Record Number: [id number]  Today's Date: 9/27/2024    Procedure: Soft tissue lesion biopsy  Proceduralist: Simone Latham PA-C  Pathology present: no    Procedure Start: 1127  Procedure end: 1137  Sedation medications administered: 2 mg Midazolam, 100 mcg Fentanyl     Report given to: ARIS Tan RN      Other Notes: Pt arrived to IR room 6 from . Consent reviewed. Pt denies any questions or concerns regarding procedure. Pt positioned prone and monitored per protocol. Pt tolerated procedure without any noted complications. Pt transferred back to .

## 2024-10-01 LAB
PATH REPORT.COMMENTS IMP SPEC: NORMAL
PATH REPORT.COMMENTS IMP SPEC: NORMAL
PATH REPORT.FINAL DX SPEC: NORMAL
PATH REPORT.GROSS SPEC: NORMAL
PATH REPORT.MICROSCOPIC SPEC OTHER STN: NORMAL
PATH REPORT.RELEVANT HX SPEC: NORMAL
PHOTO IMAGE: NORMAL

## 2024-10-07 DIAGNOSIS — R19.00 PELVIC MASS: Primary | ICD-10-CM

## 2024-10-07 DIAGNOSIS — K61.1 PERIRECTAL ABSCESS: ICD-10-CM

## 2024-10-07 DIAGNOSIS — R19.00 PELVIC MASS: ICD-10-CM

## 2024-10-07 NOTE — CONSULTS
Outpatient IR Referral   10/07/24    Referring Provider: Dr. Alvarado  IR Referral Request: Large pelvic mass biopsy ovarian cancer. Team would like to give chemotherapy to shrink mass prior to invasive intervention.   Recommendations/Plan: Oral contrast the night before biopsy, CT guided biopsy of the anterior rind of the necrotic mass, will review for best approach for sampling day of procedure. IF there is not a safe window for biopsy procedure will be cancelled.   TO note imaging is from 7/2024.       Surg path and flow entered under referrer.        Case and imaging was reviewed with Dr. Vishal Doherty MD and Dr. Huang. Message to Dr. Alvarado to discuss target site as the pelvic mass is surrounded by large structures and vessels.     Dr. Martin would like tissue sampling, least invasive approach, unable to obtain sample by scope, would like to give chemotherapy prior to surgical intervention.     Patient not on AC at time of referral.     IR recommendations also relayed Epic messaging.    IR referral converted to procedure order.      Brief History:    Michelle Holguin is a 64 year old female with history of DM, HLD, tobacco use, painful valvular cyst x 2  yrs, bladder sling, pelvic mass. Left gluteal nodule biopsy resulted in schwannoma 9/27/24.     Pertinent Medications:    Current Outpatient Medications   Medication Sig Dispense Refill    atorvastatin (LIPITOR) 40 MG tablet Take 40 mg by mouth daily.      cephALEXin (KEFLEX) 500 MG capsule Take 1 capsule (500 mg) by mouth 2 times daily 20 capsule 0    glimepiride (AMARYL) 4 MG tablet Take 4 mg by mouth every morning (before breakfast).       No current facility-administered medications for this visit.       Pertinent Imaging Reviewed:  CT 7/30/24 Impression            IMPRESSION:  1.  Abnormal bilobed peripherally enhancing and centrally necrotic appearing lesion visible in the false and true pelvis. This is inseparable from the cecum raising the  "possibility of a colon/appendiceal primary. It is inseparable from the left ovary [right ovary appears normal by CT] raising the possibility of ovarian primary. It is also inseparable from the uterine fundus. This likely represents uterine invasion as primary myometrial malignancy is uncommon.  2.  Several necrotic lymph nodes in the right abdominal mesentery extending to the superior aspect of the exam. These are not fully interrogated on this CT pelvis.  3.  Thickening of the round ligaments bilaterally in the inguinal canals extending to the superior labia majora bilaterally. The right round ligament is inseparable from the abnormally thickened right aspect of urinary bladder. Possibility of a bladder lesion is not excluded based on this CT. There is a coarse calcification along the right aspect of the urinary bladder which does not appear to be intraluminal.  4.  Small volume ascites is low-density.  5.  Left and sigmoid colonic diverticulosis without findings to suggest focal acute diverticulitis.  6.  I discussed the findings with Dr. Tyler via telephone at approximately 2:20 p.m. date of exam.    Most Recent Labs:  Lab Results   Component Value Date    WBC 12.2 09/27/2024     Lab Results   Component Value Date    RBC 3.98 09/27/2024     Lab Results   Component Value Date    HGB 9.2 09/27/2024     Lab Results   Component Value Date    HCT 30.5 09/27/2024     Lab Results   Component Value Date     09/27/2024    Last Comprehensive Metabolic Panel:  No results found for: \"NA\", \"POTASSIUM\", \"CHLORIDE\", \"CO2\", \"ANIONGAP\", \"GLC\", \"BUN\", \"CR\", \"GFRESTIMATED\", \"SHOBHA\"   INR   Date Value Ref Range Status   09/27/2024 1.05 0.85 - 1.15 Final          If requesting team would like sample sent for anything else please use the IR order set \"IR RAD Biopsy or Fluid Aspirate Specimens\" to select your necessary diagnostic labs and pend for admission.     If there are labs you desire that are not found in this order set or " you have questions regarding specific diagnostic labs please call the associated lab personnel.        DAMARIS López CNP  Interventional Radiology   [phone] (IR RN triage)

## 2024-10-08 ENCOUNTER — PATIENT OUTREACH (OUTPATIENT)
Dept: ONCOLOGY | Facility: CLINIC | Age: 64
End: 2024-10-08
Payer: COMMERCIAL

## 2024-10-08 NOTE — PROGRESS NOTES
New Patient Oncology Nurse Navigator Note     Referring provider:   Referred By    Provider Department Location Phone   Stevie Alvarado MD [facility] Colon [facility] Rectal Surgery [facility] [phone]        Referred to (specialty): Medical Oncology    Requested provider (if applicable):   Ernie Moffett or Pedrito     Date Referral Received:   10/7/24     Evaluation for :   pelvic scwannoma + large pelvic mass (ernie gant prakash)       Clinical History (per Nurse review of records provided):    Patient was seen by Dr. Alvarado, CRS, for pelvic mass on 9/5/24:    PLAN  1. I am concerned that we will not tolerated a colonoscopy, and the size of the tumor will make a colonoscopy challenging if not possible to be performed.  2. Present at tumor board to review imaging. Obtain images.  3. Given its large size, I favor neoadjuvant chemotherapy. Given the markedly elevated CEA, I strongly suspect colonic origin. Neoadjuvant treatment will allow it to shrink in size increasing the likelihood of achieving an R0 resection.  4. Must quit smoking, discussed this at length.    Final Diagnosis   A.  Soft tissue, left gluteal mass, biopsy:  - Schwannoma  - Negative for malignancy   Electronically signed by Jac Chacon MD on 10/1/2024 at  2:48 PM     pelvic scwannoma + large pelvic mass (ernie gant prakash)    10/10/24  CT ABDOMEN & PELVIS W/O ORAL W/O IV CON  Order: [id number]  Impression    IMPRESSION:    Large necrotic pelvic mass centered slightly to the left of midline in the false pelvis has grown compared to recent prior exams dating back to 7/30/2024. It site of origin is unclear, but this is presumably malignant. Mesenteric adenopathy is present superior to the mass. Fluid collection to the right of the mass measures about 16 cm in greatest dimension and has grown. Nearby free fluid in the cul-de-sac has increased in amount.    Slight left hydronephrosis/hydroureter, new since  8/23/2024. It is suspected this is related to the presence of the large pelvic mass. Urinary bladder is abnormal, with bladder wall thickening; the urinary bladder is not clearly separable from the adjacent pelvic mass. 1 cm urinary bladder stone. No abnormality involving the right kidney, upper renal collecting system, or ureter.    Outside the left kidney, no solid abdominal organ abnormality.    No biliary tract abnormality.    No dilated bowel. Sigmoid colon courses near the large pelvic mass; sigmoid colon wall thickening is suggested, which could be on the basis of malignancy or inflammation. Appendix not seen.    REPORT SIGNED BY DR. Ej Bell  Records Location (Care Everywhere, Media, etc.):   Epic      Additional testing needed prior to consult: IR biopsy?    10/11/24  IR biopsy has not yet been scheduled, but Dr. Alvarado' office reached out looking to get patient in ASAP.  We are able to offer appointment on 10/18.   I called in attempt to reach patient to discuss referral to oncology.  There was no answer so I left message with our new patient scheduling number: [phone], for patient to call to schedule consult appointment.  I left my number as well if patient has     I will forward on referral at this time with the following plan-     TENTATIVE PLAN:  SCHEDULE: HOLD: 10/18 Dr. Major, [facility], 9-10 AM, NEW, in person    I updated CRS of this plan as well.    Bernice Gonzalez, RN, BSN  Oncology New Patient Nurse Navigator   [facility] [place]  [phone]

## 2024-10-15 NOTE — TELEPHONE ENCOUNTER
RECORDS STATUS - ALL OTHER DIAGNOSIS      Imaging Received  October 16, 2024 8:31 AM    Action: Images from [place] and [facility] Rad received and resolved to PACS.     RECORDS RECEIVED FROM: [facility]   NOTES STATUS DETAILS   OFFICE NOTE from referring provider Epic 9/5/24: Dr. Stevie Alvarado   OFFICE NOTE from medical oncologist External: [facility] 8/22/24: Dr. Lanie Durand   DISCHARGE REPORT from the ER [facility]- [facility] 10/10/24: [place] [facility]   OPERATIVE REPORT Epic 9/27/24: US soft tissue lesion bx   MEDICATION LIST [facility]- [facility]    LABS     PATHOLOGY REPORTS Report in Epic 9/27/24: [id number]    ANYTHING RELATED TO DIAGNOSIS [facility]- [facility] Most recent 10/10/24   IMAGING (NEED IMAGES & REPORT)     CT SCANS PACS/ (Img req from [facility]) [facility]:   10/10/24: CT AP    PACS:  8/23/24: CT Chest Abd  7/30/24: CT Pel Bone   MRI (Img req center from [facility] RAD) Img Center:   8/23/24: MR Pel

## 2024-10-17 ENCOUNTER — TELEPHONE (OUTPATIENT)
Dept: SURGERY | Facility: CLINIC | Age: 64
End: 2024-10-17
Payer: COMMERCIAL

## 2024-10-17 ENCOUNTER — PATIENT OUTREACH (OUTPATIENT)
Dept: ONCOLOGY | Facility: CLINIC | Age: 64
End: 2024-10-17
Payer: COMMERCIAL

## 2024-10-17 NOTE — TELEPHONE ENCOUNTER
[facility] Call Center    Phone Message    May a detailed message be left on voicemail: yes     Reason for Call: Other: Pt's daughter, Parvin, is calling in asking for a call back. She states that they had to bring the Pt to the ED for bleeding and were instructed by them to get in contact with Dr. Alvarado regarding a plan of care for the Pt going forward. Please call back as soon as possible to discuss.     Action Taken: Message routed to:  [facility] RASHID    Travel Screening: Not Applicable     Date of Service:

## 2024-10-17 NOTE — TELEPHONE ENCOUNTER
[facility]: Cancer Care                                                                                          Received message from scheduling that daughter called to report pt was admitted at [facility] and still wants to keep appointment with Dr. Major 10/18. Pt does not have Mychart and no Consent to Communicate on file for family members.    Writer called pt's cell phone back and reached kb wiggins that if family is calling on pt's behalf, she will need to have either signed a Consent to Communicate or give verbal consent to speak with staff regarding her appointments.     Signature:  Darlene Munguia RN

## 2024-10-17 NOTE — TELEPHONE ENCOUNTER
"[facility]: Cancer Care                                                                                          Call returned with pt on the line and daughter Parvin. Both are questioning a cancer diagnosis and state their confusion why pt is being referred to [facility] versus [facility] or [facility]. Pt is currently admitted for vaginal bleeding and vomiting. They feel they're being \"pushed around\" and hesitate to reschedule Oncology appointment to next week. Stated she also had an Oncology appt at [facility] that was cancelled because they were told pathology did not show colon cancer but Schwannoma.     Advised pt she is in the right place to stabilize her at this time. If there are questions regarding needing another biopsy to diagnosis cancer, would recommend pt speak with hospitalist at [facility] to arrange. Pt is not required to come to [facility] for care, and all options may be explored whether she will receive the same care at [facility] which is [facility] or [facility] which is her preference. Daughter asked for Dr. Alvarado clinic number which was given to her ([phone]) she wants to call and ask if another biopsy was being scheduled. Writer's direct extension also given to pt and daughter to follow-up once she discharges. Both in agreement to cancel Oncology appt with Dr. Major tomorrow.    Signature:  Darlene Munguia RN  "

## 2024-10-18 ENCOUNTER — PRE VISIT (OUTPATIENT)
Dept: ONCOLOGY | Facility: CLINIC | Age: 64
End: 2024-10-18
Payer: COMMERCIAL

## 2024-10-18 NOTE — TELEPHONE ENCOUNTER
[facility]: Cancer Care                                                                                          Pt discharged from [facility]. Interested in virtual visit with Oncology, scheduled with Dr. Sheehan Monday 10/21 at 9:45 am.    Signature:  Darlene Munguia RN

## 2024-10-18 NOTE — PROGRESS NOTES
Virtual Visit Details    Type of service:  Video Visit     Originating Location (pt. Location): Home    Distant Location (provider location):  On-site  Platform used for Video Visit: [facility] ONCOLOGY NEW PATIENT VISIT - INITIAL CONSULTATION NOTE - Dr. Brad Sheehan  Date of encounter: October 21, 2024    Cancer diagnosis: large pelvic mass suspected to have originated from the cecum/appendix, or other portion of the large intestines, with extension to the left ovary, and extensive mesenteric adenopathy.    Treatment to date: None/pending    Tumor genomic profiling: none/pending biopsy base confirmation of cancer diagnosis    Referring physician or other provider(s):  Dr. Stevie Alvarado, Colorectal Surgery, [facility]  Other physicians who evaluated this patient to date: Dr. Dagoberto Sam; Dr. Lanie Durand, Gynecologic Oncology, [facility]      History of Present Illness/Cancer Diagnosis and Evaluation to date:  Ms. Holguin is a 64 year old female who joins me today for initial consultation regarding a diagnosis of a large pelvic mass, with strong suspicion for locally extensive malignancy, possibly gastrointestinal in origin.  She joins were virtual video visit from her home in [place], and her daughter Parvin duffy from a second location, as does the patient's cousin Dionna from a separate third location.    Ms Holguin has a complicated situation, and was referred to me by Dr. Alvarado from our colorectal surgery team for a strongly suspected intestinal malignancy.  A large part of the patient and her family's confusion was a recent left gluteal mass that was biopsied and found to be benign, and actually a schwannoma.  They feel for this reason, and expressed this to other oncology team members at the [facility], confusion about why she's been told she has a cancer diagnosis.  See telephone note from last week from our RN coordinator, in which Ms Holguin has expressed that she wishes to follow with an oncology team at  [facility], as that is more convenient for home.    To briefly summarize pertinent points that are also in other notes: in July 2024, she developed pelvic pain and possible vulvar cysts or abscesses, that led to a pelvic CT performed July 30, 2024.  The results were reported as follows:  Pelvis CT (7/30/24)  IMPRESSION:   1.  Abnormal bilobed peripherally enhancing and centrally necrotic appearing lesion visible in the false and true pelvis. This is inseparable from the cecum raising the possibility of a colon/appendiceal primary. It is inseparable from the left ovary [right ovary appears normal by CT] raising the possibility of ovarian primary. It is also inseparable from the uterine fundus. This likely represents uterine invasion as primary myometrial malignancy is uncommon.   2.  Several necrotic lymph nodes in the right abdominal mesentery extending to the superior aspect of the exam. These are not fully interrogated on this CT pelvis.   3.  Thickening of the round ligaments bilaterally in the inguinal canals extending to the superior labia majora bilaterally. The right round ligament is inseparable from the abnormally thickened right aspect of urinary bladder. Possibility of a bladder lesion is not excluded based on this CT. There is a coarse calcification along the right aspect of the urinary bladder which does not appear to be intraluminal.   4.  Small volume ascites is low-density.   5.  Left and sigmoid colonic diverticulosis without findings to suggest focal acute diverticulitis.       Out of concern for a possible intestinal malignancy, a CEA blood test was run from blood, and elevated, as follows: CEA (8/7/24): 723    She met with [facility]-based gynecologic oncologist Dr. Lanie Durand (Gyn Oncologist)  who in turn referred her to Dr. Sam due to the concern for a primary gastrointestinal/colorectal tumor of origin.  Today Ms Holguin expresses that she does not recall meeting Dr. Sam, but I do see a CT  scan that was reported as follows:  CT CAP (8/24/24)  1.  No evidence of thoracic or upper abdominal metastatic disease.   2.  No evidence of an acute thoracic abnormality.   3.  Stable 3 mm noncalcified right lower lobe pulmonary nodule.       The MRI pelvis at [facility] again demonstrated a large mass, concerning for malignancy as follows:    8/23/24--MRI pelvis at [facility]: IMPRESSION:   1. Redemonstrated large irregular thick walled mass lesion in the pelvis with surrounding adenopathy and adjacent probably necrotic soft tissue nodules or lymph nodes. This appears to be separate from the ovaries on the current examination. Question colonic origin including possible lymphoma. There is no proximal obstruction visualized on the current exam. Small volume adjacent loculated ascites in the low pelvis and right adnexal region. The mass lesion does abut and cause mass effect on the fundal portion of the myometrium, cannot exclude myometrial invasion.   2. Additional 1.5 cm enhancing nodule lateral to the left sciatic notch, stable from CT examination, technically indeterminate. Possible node or vascular anomaly. Correlation with any prior outside imaging of the pelvis is recommended to evaluate for presence.   3. Extensive generally chronic appearing diverticulosis of the sigmoid colon.   4. Suspected prior operative changes of bladder sling with adjacent small fat-containing inguinal hernias and likely postprocedural chronic calcification along the rightward aspect of the urinary bladder wall.   5.. Incidentally noted thickened endometrial contents for a presumed postmenopausal patient measuring up to 9 mm.     She has not had prior colonoscopies, and Dr. Alvarado met with her on September 5, 2024 and expressed concern about whether or not she could tolerate a colonoscopy to biopsy the likely primary site of anatomic origin, in the cecum or in that environment.  MRI review of the outside scan was  as follows, as performed at [facility]:    Sept 3, 2024--MRI IMPRESSION:   1. Large irregular thick-walled centrally necrotic neoplastic mass in  the central pelvis involving the cecum and invading the fundal  myometrium, suspected cecal/appendiceal origin.  2. Multiple necrotic right mesenteric and right presacral soft tissue  nodules/lymph nodes are likely metastatic.  3. Bilateral borderline enlarged, necrotic external iliac lymph nodes  are likely metastatic.  4. Approximately 1.8 cm soft tissue nodule in the left sciatic notch  is concerning for metastasis.  5. Evidence of peritoneal carcinomatosis with scattered peritoneal  nodules in the right lower quadrant/pelvis. Small volume loculated  fluid in the right adnexa and dependent pelvis is probably malignant.  6. Asymmetric thickening of the right lateral bladder wall with  underlying urothelial enhancement may represent focal cystitis.         A superficial lesion in the left gluteus Timoteo region was visualized and thought to be a primary site for biopsying, considering that it might represent a side of metastatic disease.  However, the biopsy did not yield evidence of malignancy:  Sept 27, 2024--  Specimen:    Buttock, Gluteal Cleft, left gluteal mass                                                  Final Diagnosis   A.  Soft tissue, left gluteal mass, biopsy:  - Schwannoma  - Negative for malignancy          Therein there is a challenge in that Ms Holguin and her family state that the appointment that had been arranged at [facility] in early to mid October for her to meet with an oncologist there (Dr. David or Ivan?) they state was canceled by the [facility] clinic because the above gluteal biopsy was negative for cancer.    At the current time, a biopsy through the IR team is scheduled for this Friday, October 25.    Last week, on October 17, she was evaluated in the ER at [facility] for vaginal bleeding, possibly rectal bleeding as  well, and she was discharged with prescription for hydromorphone, and also recommended to take Tylenol and Motrin, all of which, in combination for pain.    Today, her cousin Dionna and also daughter Parvin as well as the patient herself bring up concerns about pain control, she has ongoing smoking of cigarettes, they expressed confusion about why a cancer diagnosis is being pursued in light of the negative biopsy results from the gluteus biopsy.        Review Of Systems:  Comprehensive (14-point) ROS reviewed. Pertinent symptoms reviewed above per HPI.      Past medical, social, surgical, and family histories reviewed, confirmed, and pertinent details discussed with patient and family; additional sources include the medical record.      Past Medical History:   Diagnosis Date    Diabetes (H)     Hyperlipidemia       Past Surgical History:   Procedure Laterality Date    IR SOFT TISSUE BIOPSY  9/27/2024          SOCIAL HISTORY: Lives in [facility]. She smokes cigarettes. Her daughter Parvin joins the call today, as does her cousin Dionna.    FAMILY HISTORY OF CANCER: Not discussed in depth this visit.      Allergies:  Allergies as of 10/21/2024 - Reviewed 09/27/2024   Allergen Reaction Noted    Codeine GI Disturbance 06/14/2006    Nickel Rash 06/14/2006       Current Medications:  Current Outpatient Medications   Medication Sig Dispense Refill    atorvastatin (LIPITOR) 40 MG tablet Take 40 mg by mouth daily.      cephALEXin (KEFLEX) 500 MG capsule Take 1 capsule (500 mg) by mouth 2 times daily 20 capsule 0    glimepiride (AMARYL) 4 MG tablet Take 4 mg by mouth every morning (before breakfast).          Physical Exam:  In-person physical exam could not be performed today in context of a Virtual Visit. Observed physical assessments made today by visualizing the patient by video link:  Vitals - Patient Reported  Pain Score: Severe Pain (7)  Pain Loc:  (groin areas)       Estimated ECOG 1      General/Constitutional: appears  uncomfortable, laying on her bed during the encounter, smoking cigarette.  HEENT: no scleral icterus, not jaundiced.  Respiratory: no labored breathing.  Musculoskeletal: appears to have full range of motion and adequate physical strength.  Skin: no jaundice, discoloration or other notable lesions.  Neurological: no evidence of tremors.  Psychiatric: no evident anxiety; fully alert and oriented with fluent speech.      The rest of a comprehensive physical examination is deferred due to nature of video visits.     Laboratory/Imaging Studies  No visits with results within 2 Week(s) from this visit.   Latest known visit with results is:   Lab Requisition on 08/07/2024   Component Date Value Ref Range Status     08/07/2024 12  <=38 U/mL Final    CEA 08/07/2024 723.0  ng/mL Final    Nonsmoker (past/never) <=5.0 ng/mL   Current smoker <=6.5 ng/mL     This result is obtained using the Roche Elecsys CEA method on the bel [id number] immunoassay analyzer. Results obtained with different assay methods or kits cannot be used interchangeably.          RADIOLOGY:  Prior to and including the day of this visit, I personally reviewed the recent imaging scans and/or reports that were available for viewing from outside facilities via Care Everywhere. I reviewed the summary verbatim and in lay language during today's call.     ASSESSMENT/PLAN:  Ms Holguin is a 64-year-old woman experiencing vaginally and/or rectal bleeding, initially undergoing a gynecologic work up in July 2024, leading in late July 2024 of the radiographic diagnosis of a large pelvic mass that was necrotic and possibly involving the left ovary, felt at this time to more likely to be a malignancy of lower intestinal origin, rather than ovarian or other form of malignancy as site of origin.  The diagnosis is made in late July radiographically, but not yet on histopathology.  An overarching concern and a source of confusion has been a left gluteal mass that appears to  be completely unrelated to what is going on internally, including the left pelvic mass, as the gluteal biopsy yielded evidence of schwannoma and no evidence of malignancy; understandably, this result has caused confusion for the patient and her family as to why the cancer diagnosis is being pursued, and recommendations by Dr. Alvarado for neoadjuvant intent chemotherapy leading up to possible intestinal resection.    I reviewed the above cited imaging and pathology results in lay terms, overall, focusing on the point that the schwannoma and related gluteal mass is extremely unlikely to be related to what is going on and is reported in her abdomen and pelvis.  The large pelvic mass likely represents metastatic extension of intestinal malignancy, as reported, and it is concerning that the October 10 outside CT scan does report objective growth of this pelvic mass since the July 31 CT, also consistent with a abdominopelvic malignancy, whether it be intestinal or gynecologic in origin.  I emphasized the importance of making an accurate diagnosis, and Dr. Alvarado and team have already arranged through the interventional radiology team at the [facility] for a biopsy to be performed in four days.  I encouraged Mr. Holguin and her family members to keep that appointment, and the bulk of the appointment otherwise focused on her wish to pursue care at [facility] as that site is much more convenient to her home.  I advised that she connect with her primary care physician as needed, and also to call the [facility] clinic directly to provide the perspective of the strong likelihood of cancer diagnosis that is likely to be histopathologically proven following biopsy later this week, and also in the context of Dr. Alvarado's documented concerns from his September 5 evaluation and related note.    They stated understanding, and Parvin stated that she would make the call to [facility] immediately after the conclusion of today's  encounter.    Regarding pain management, I did no documentation that Ms Holguin had been prescribed hydromorphone already through the ER on October 17, and also recommended to alternate Tylenol and Motrin, and she stated she is not yet done.  I also explained the role of medical oncology teams in general, for symptomatic care, very much included pain management, and the roles of PalliativeCare/Supportive Care specialist for additional symptom management when and where available.  In the meantime, as she already has a primary care physician, and other medical specialists near her who have evaluated her more extensively in person, I advised that she can call upon those medical specialists to prescribe bridge medications for pain if needed until she can meet her oncologist at [facility] where she states she will establish care.    By the end of the visit, Ms Holguin and her daughter Parvin and cousin Dionna expressed full understanding of the content of the above conversation.      VIRTUAL VISIT - DETAILS:    I have reviewed the note as documented above. This accurately captures the substance of my conversation with the patient and stated thanks for today's encounter.    Date of call: October 18, 2024   Start of call: 9:48 AM  End of call: 10:14 AM    Provider location: [facility] (academic office)  Patient location: Home      Mode of Video Visit: thanh         I spent 26 minutes in consultation, including history and discussion with the patient including review of recent lab values and radiologic imaging results.  An additional 40 minutes was spent on the day of the visit, including reviewing pertinent medical notes and documentation from other physicians and APPs who have evaluated and treated this patient, pertinent lab values, pathology and imaging results, personal review of radiologic images, discussing the case with referring providers and/or nurse coordinator team, post-visit orders, and all post-visit  documentation.    Brad Sheehan MD PhD       The above was transcribed using Dragon voice recognition software that is now required for use by [facility] and [facility] in place of transcription of dictated notes.  This change may unfortunately lead into an increase in errors in the EMR. While I reviewed and edited the transcription, I may miss errors.  Please let me know of any of serious errors and I will addend the note.

## 2024-10-18 NOTE — CONFIDENTIAL NOTE
Attempted to contacted Parvin per her request. She is asking for clarification on her mom's plan of care. She did not answer, so I contacted Michelle, the patient instead.     Discussed that the first biopsy did not show cancer, we thought this would be indicitive of the primary mass but it was not. Dr. Alvarado now wants to get another biopsy of the main mass to confirm the suspicion of cancer.     IR has attempted multiple times to get Michelle scheduled for the biopsy but they have not returned the call.  I discussed this with Parvin. She needs to see an oncologist to discuss the plan. She likely has cancer, given her significantly elevated CEA -we just do not have a diagnosis at this time.    She did cancel her oncology appointment today- she said that she didn't want to see onc until she had the biopsy completed.... I did urge her to call IR to schedule this biopsy and provided her with the number.     She denied further questions or concerns and she understood the further plan of care. [facility] as [facility]'t have a [facility], and she would like to have her oncology visit virtually.

## 2024-10-21 ENCOUNTER — VIRTUAL VISIT (OUTPATIENT)
Dept: ONCOLOGY | Facility: CLINIC | Age: 64
End: 2024-10-21
Attending: INTERNAL MEDICINE
Payer: COMMERCIAL

## 2024-10-21 VITALS — WEIGHT: 149 LBS | BODY MASS INDEX: 26.4 KG/M2 | HEIGHT: 63 IN

## 2024-10-21 DIAGNOSIS — R19.00 PELVIC MASS: ICD-10-CM

## 2024-10-21 PROCEDURE — 99205 OFFICE O/P NEW HI 60 MIN: CPT | Mod: 95 | Performed by: INTERNAL MEDICINE

## 2024-10-21 ASSESSMENT — PAIN SCALES - GENERAL: PAINLEVEL: SEVERE PAIN (7)

## 2024-10-21 NOTE — NURSING NOTE
Patient confirms medications and allergies are accurate via patients echeck in completion, and or denies any changes since last reviewed/verified.       [place] [facility] [place] [address] [facility] [address]    Is the patient currently in the state of [place]? YES    Visit mode:VIDEO    If the visit is dropped, the patient can be reconnected by: VIDEO VISIT: Text to cell phone:   Telephone Information:   Mobile [phone]       Will anyone else be joining the visit?   Kasia [phone], Cousin Dionna [phone]  (If patient encounters technical issues they should call [phone]    Are changes needed to the allergy or medication list? No    Are refills needed on medications prescribed by this physician? NO    Rooming Documentation:  Questionnaire(s) not done per department protocol    Reason for visit: Consult    Roselia Go VVF

## 2024-10-21 NOTE — LETTER
10/21/2024      Michelle Holguin  [address] [facility] [address]      Dear Colleague,    Thank you for referring your patient, Michelle Holguin, to the [facility]. Please see a copy of my visit note below.    Virtual Visit Details    Type of service:  Video Visit     Originating Location (pt. Location): Home    Distant Location (provider location):  On-site  Platform used for Video Visit: [facility] ONCOLOGY NEW PATIENT VISIT - INITIAL CONSULTATION NOTE - Dr. Brad Sheehan  Date of encounter: October 21, 2024    Cancer diagnosis: large pelvic mass suspected to have originated from the cecum/appendix, or other portion of the large intestines, with extension to the left ovary, and extensive mesenteric adenopathy.    Treatment to date: None/pending    Tumor genomic profiling: none/pending biopsy base confirmation of cancer diagnosis    Referring physician or other provider(s):  Dr. Stevie Alvarado, Colorectal Surgery, [facility]  Other physicians who evaluated this patient to date: Dr. Dagoberto Sam; Dr. Lanie Durand, Gynecologic Oncology, [facility]      History of Present Illness/Cancer Diagnosis and Evaluation to date:  Ms. Holguin is a 64 year old female who joins me today for initial consultation regarding a diagnosis of a large pelvic mass, with strong suspicion for locally extensive malignancy, possibly gastrointestinal in origin.  She joins were virtual video visit from her home in [place], and her daughter Parvin duffy from a second location, as does the patient's cousin Dionna from a separate third location.    Ms Holguin has a complicated situation, and was referred to me by Dr. Alvarado from our colorectal surgery team for a strongly suspected intestinal malignancy.  A large part of the patient and her family's confusion was a recent left gluteal mass that was biopsied and found to be benign, and actually a schwannoma.  They feel for this reason, and expressed this to other oncology team  members at the [facility], confusion about why she's been told she has a cancer diagnosis.  See telephone note from last week from our RN coordinator, in which Ms Holguin has expressed that she wishes to follow with an oncology team at [facility], as that is more convenient for home.    To briefly summarize pertinent points that are also in other notes: in July 2024, she developed pelvic pain and possible vulvar cysts or abscesses, that led to a pelvic CT performed July 30, 2024.  The results were reported as follows:  Pelvis CT (7/30/24)  IMPRESSION:   1.  Abnormal bilobed peripherally enhancing and centrally necrotic appearing lesion visible in the false and true pelvis. This is inseparable from the cecum raising the possibility of a colon/appendiceal primary. It is inseparable from the left ovary [right ovary appears normal by CT] raising the possibility of ovarian primary. It is also inseparable from the uterine fundus. This likely represents uterine invasion as primary myometrial malignancy is uncommon.   2.  Several necrotic lymph nodes in the right abdominal mesentery extending to the superior aspect of the exam. These are not fully interrogated on this CT pelvis.   3.  Thickening of the round ligaments bilaterally in the inguinal canals extending to the superior labia majora bilaterally. The right round ligament is inseparable from the abnormally thickened right aspect of urinary bladder. Possibility of a bladder lesion is not excluded based on this CT. There is a coarse calcification along the right aspect of the urinary bladder which does not appear to be intraluminal.   4.  Small volume ascites is low-density.   5.  Left and sigmoid colonic diverticulosis without findings to suggest focal acute diverticulitis.       Out of concern for a possible intestinal malignancy, a CEA blood test was run from blood, and elevated, as follows: CEA (8/7/24): 723    She met with [facility]-based gynecologic  oncologist Dr. Lanie Durand (Gyn Oncologist)  who in turn referred her to Dr. Sam due to the concern for a primary gastrointestinal/colorectal tumor of origin.  Today Ms Holguin expresses that she does not recall meeting Dr. Sam, but I do see a CT scan that was reported as follows:  CT CAP (8/24/24)  1.  No evidence of thoracic or upper abdominal metastatic disease.   2.  No evidence of an acute thoracic abnormality.   3.  Stable 3 mm noncalcified right lower lobe pulmonary nodule.       The MRI pelvis at [facility] again demonstrated a large mass, concerning for malignancy as follows:    8/23/24--MRI pelvis at [facility]: IMPRESSION:   1. Redemonstrated large irregular thick walled mass lesion in the pelvis with surrounding adenopathy and adjacent probably necrotic soft tissue nodules or lymph nodes. This appears to be separate from the ovaries on the current examination. Question colonic origin including possible lymphoma. There is no proximal obstruction visualized on the current exam. Small volume adjacent loculated ascites in the low pelvis and right adnexal region. The mass lesion does abut and cause mass effect on the fundal portion of the myometrium, cannot exclude myometrial invasion.   2. Additional 1.5 cm enhancing nodule lateral to the left sciatic notch, stable from CT examination, technically indeterminate. Possible node or vascular anomaly. Correlation with any prior outside imaging of the pelvis is recommended to evaluate for presence.   3. Extensive generally chronic appearing diverticulosis of the sigmoid colon.   4. Suspected prior operative changes of bladder sling with adjacent small fat-containing inguinal hernias and likely postprocedural chronic calcification along the rightward aspect of the urinary bladder wall.   5.. Incidentally noted thickened endometrial contents for a presumed postmenopausal patient measuring up to 9 mm.     She has not had prior colonoscopies, and   Jenny met with her on September 5, 2024 and expressed concern about whether or not she could tolerate a colonoscopy to biopsy the likely primary site of anatomic origin, in the cecum or in that environment.  MRI review of the outside scan was as follows, as performed at [facility]:    Sept 3, 2024--MRI IMPRESSION:   1. Large irregular thick-walled centrally necrotic neoplastic mass in  the central pelvis involving the cecum and invading the fundal  myometrium, suspected cecal/appendiceal origin.  2. Multiple necrotic right mesenteric and right presacral soft tissue  nodules/lymph nodes are likely metastatic.  3. Bilateral borderline enlarged, necrotic external iliac lymph nodes  are likely metastatic.  4. Approximately 1.8 cm soft tissue nodule in the left sciatic notch  is concerning for metastasis.  5. Evidence of peritoneal carcinomatosis with scattered peritoneal  nodules in the right lower quadrant/pelvis. Small volume loculated  fluid in the right adnexa and dependent pelvis is probably malignant.  6. Asymmetric thickening of the right lateral bladder wall with  underlying urothelial enhancement may represent focal cystitis.         A superficial lesion in the left gluteus Timoteo region was visualized and thought to be a primary site for biopsying, considering that it might represent a side of metastatic disease.  However, the biopsy did not yield evidence of malignancy:  Sept 27, 2024--  Specimen:    Buttock, Gluteal Cleft, left gluteal mass                                                  Final Diagnosis   A.  Soft tissue, left gluteal mass, biopsy:  - Schwannoma  - Negative for malignancy          Therein there is a challenge in that Ms Holguin and her family state that the appointment that had been arranged at [facility] in early to mid October for her to meet with an oncologist there (Dr. David or Ivan?) they state was canceled by the [facility] clinic because the above gluteal biopsy was  negative for cancer.    At the current time, a biopsy through the IR team is scheduled for this Friday, October 25.    Last week, on October 17, she was evaluated in the ER at [facility] for vaginal bleeding, possibly rectal bleeding as well, and she was discharged with prescription for hydromorphone, and also recommended to take Tylenol and Motrin, all of which, in combination for pain.    Today, her cousin Dionna and also daughter Parvin as well as the patient herself bring up concerns about pain control, she has ongoing smoking of cigarettes, they expressed confusion about why a cancer diagnosis is being pursued in light of the negative biopsy results from the gluteus biopsy.        Review Of Systems:  Comprehensive (14-point) ROS reviewed. Pertinent symptoms reviewed above per HPI.      Past medical, social, surgical, and family histories reviewed, confirmed, and pertinent details discussed with patient and family; additional sources include the medical record.      Past Medical History:   Diagnosis Date     Diabetes (H)      Hyperlipidemia       Past Surgical History:   Procedure Laterality Date     IR SOFT TISSUE BIOPSY  9/27/2024          SOCIAL HISTORY: Lives in [facility]. She smokes cigarettes. Her daughter Parvin joins the call today, as does her cousin Dionna.    FAMILY HISTORY OF CANCER: Not discussed in depth this visit.      Allergies:  Allergies as of 10/21/2024 - Reviewed 09/27/2024   Allergen Reaction Noted     Codeine GI Disturbance 06/14/2006     Nickel Rash 06/14/2006       Current Medications:  Current Outpatient Medications   Medication Sig Dispense Refill     atorvastatin (LIPITOR) 40 MG tablet Take 40 mg by mouth daily.       cephALEXin (KEFLEX) 500 MG capsule Take 1 capsule (500 mg) by mouth 2 times daily 20 capsule 0     glimepiride (AMARYL) 4 MG tablet Take 4 mg by mouth every morning (before breakfast).          Physical Exam:  In-person physical exam could not be performed today in  context of a Virtual Visit. Observed physical assessments made today by visualizing the patient by video link:  Vitals - Patient Reported  Pain Score: Severe Pain (7)  Pain Loc:  (groin areas)       Estimated ECOG 1      General/Constitutional: appears uncomfortable, laying on her bed during the encounter, smoking cigarette.  HEENT: no scleral icterus, not jaundiced.  Respiratory: no labored breathing.  Musculoskeletal: appears to have full range of motion and adequate physical strength.  Skin: no jaundice, discoloration or other notable lesions.  Neurological: no evidence of tremors.  Psychiatric: no evident anxiety; fully alert and oriented with fluent speech.      The rest of a comprehensive physical examination is deferred due to nature of video visits.     Laboratory/Imaging Studies  No visits with results within 2 Week(s) from this visit.   Latest known visit with results is:   Lab Requisition on 08/07/2024   Component Date Value Ref Range Status      08/07/2024 12  <=38 U/mL Final     CEA 08/07/2024 723.0  ng/mL Final    Nonsmoker (past/never) <=5.0 ng/mL   Current smoker <=6.5 ng/mL     This result is obtained using the Roche Elecsys CEA method on the bel [id number] immunoassay analyzer. Results obtained with different assay methods or kits cannot be used interchangeably.          RADIOLOGY:  Prior to and including the day of this visit, I personally reviewed the recent imaging scans and/or reports that were available for viewing from outside facilities via Care Everywhere. I reviewed the summary verbatim and in lay language during today's call.     ASSESSMENT/PLAN:  Ms Holguin is a 64-year-old woman experiencing vaginally and/or rectal bleeding, initially undergoing a gynecologic work up in July 2024, leading in late July 2024 of the radiographic diagnosis of a large pelvic mass that was necrotic and possibly involving the left ovary, felt at this time to more likely to be a malignancy of lower intestinal  origin, rather than ovarian or other form of malignancy as site of origin.  The diagnosis is made in late July radiographically, but not yet on histopathology.  An overarching concern and a source of confusion has been a left gluteal mass that appears to be completely unrelated to what is going on internally, including the left pelvic mass, as the gluteal biopsy yielded evidence of schwannoma and no evidence of malignancy; understandably, this result has caused confusion for the patient and her family as to why the cancer diagnosis is being pursued, and recommendations by Dr. Alvarado for neoadjuvant intent chemotherapy leading up to possible intestinal resection.    I reviewed the above cited imaging and pathology results in lay terms, overall, focusing on the point that the schwannoma and related gluteal mass is extremely unlikely to be related to what is going on and is reported in her abdomen and pelvis.  The large pelvic mass likely represents metastatic extension of intestinal malignancy, as reported, and it is concerning that the October 10 outside CT scan does report objective growth of this pelvic mass since the July 31 CT, also consistent with a abdominopelvic malignancy, whether it be intestinal or gynecologic in origin.  I emphasized the importance of making an accurate diagnosis, and Dr. Alvarado and team have already arranged through the interventional radiology team at the [facility] for a biopsy to be performed in four days.  I encouraged Mr. Holguin and her family members to keep that appointment, and the bulk of the appointment otherwise focused on her wish to pursue care at [facility] as that site is much more convenient to her home.  I advised that she connect with her primary care physician as needed, and also to call the [facility] clinic directly to provide the perspective of the strong likelihood of cancer diagnosis that is likely to be histopathologically proven following biopsy later  this week, and also in the context of Dr. Alvarado's documented concerns from his September 5 evaluation and related note.    They stated understanding, and Parvin stated that she would make the call to [facility] immediately after the conclusion of today's encounter.    Regarding pain management, I did no documentation that Ms Holguin had been prescribed hydromorphone already through the ER on October 17, and also recommended to alternate Tylenol and Motrin, and she stated she is not yet done.  I also explained the role of medical oncology teams in general, for symptomatic care, very much included pain management, and the roles of PalliativeCare/Supportive Care specialist for additional symptom management when and where available.  In the meantime, as she already has a primary care physician, and other medical specialists near her who have evaluated her more extensively in person, I advised that she can call upon those medical specialists to prescribe bridge medications for pain if needed until she can meet her oncologist at [facility] where she states she will establish care.    By the end of the visit, Ms Holguin and her daughter Parvin and cousin Dionna expressed full understanding of the content of the above conversation.      VIRTUAL VISIT - DETAILS:    I have reviewed the note as documented above. This accurately captures the substance of my conversation with the patient and stated thanks for today's encounter.    Date of call: October 18, 2024   Start of call: 9:48 AM  End of call: 10:14 AM    Provider location: [facility] (academic office)  Patient location: Home      Mode of Video Visit: [facility]         I spent 26 minutes in consultation, including history and discussion with the patient including review of recent lab values and radiologic imaging results.  An additional 40 minutes was spent on the day of the visit, including reviewing pertinent medical notes and documentation from other physicians and APPs who have  evaluated and treated this patient, pertinent lab values, pathology and imaging results, personal review of radiologic images, discussing the case with referring providers and/or nurse coordinator team, post-visit orders, and all post-visit documentation.    Brad Sheehan MD PhD       The above was transcribed using Dragon voice recognition software that is now required for use by [facility] and [facility] in place of transcription of dictated notes.  This change may unfortunately lead into an increase in errors in the EMR. While I reviewed and edited the transcription, I may miss errors.  Please let me know of any of serious errors and I will addend the note.          Again, thank you for allowing me to participate in the care of your patient.        Sincerely,        Brad Sheehan MD

## 2024-10-24 ENCOUNTER — TELEPHONE (OUTPATIENT)
Dept: INTERVENTIONAL RADIOLOGY/VASCULAR | Facility: CLINIC | Age: 64
End: 2024-10-24
Payer: COMMERCIAL

## 2024-10-30 ENCOUNTER — TELEPHONE (OUTPATIENT)
Dept: SURGERY | Facility: CLINIC | Age: 64
End: 2024-10-30
Payer: COMMERCIAL

## 2024-10-30 NOTE — CONFIDENTIAL NOTE
Contacted Michelle to discuss IR biopsy which was cancelled on 10/25. Contacted to discuss a reschedule for this. I left her 2 voicemails.

## 2024-11-19 ENCOUNTER — TRANSITIONAL CARE UNIT VISIT (OUTPATIENT)
Dept: GERIATRICS | Facility: CLINIC | Age: 64
End: 2024-11-19
Payer: COMMERCIAL

## 2024-11-19 ENCOUNTER — DOCUMENTATION ONLY (OUTPATIENT)
Dept: GERIATRICS | Facility: CLINIC | Age: 64
End: 2024-11-19
Payer: COMMERCIAL

## 2024-11-19 VITALS
RESPIRATION RATE: 14 BRPM | TEMPERATURE: 98.2 F | HEIGHT: 63 IN | DIASTOLIC BLOOD PRESSURE: 69 MMHG | BODY MASS INDEX: 25.83 KG/M2 | SYSTOLIC BLOOD PRESSURE: 134 MMHG | WEIGHT: 145.8 LBS | HEART RATE: 81 BPM | OXYGEN SATURATION: 93 %

## 2024-11-19 DIAGNOSIS — R52 PAIN: Primary | ICD-10-CM

## 2024-11-19 PROBLEM — J43.9 PULMONARY EMPHYSEMA (H): Status: ACTIVE | Noted: 2024-04-12

## 2024-11-19 PROBLEM — R19.09 OTHER INTRA-ABDOMINAL AND PELVIC SWELLING, MASS AND LUMP: Status: ACTIVE | Noted: 2024-10-10

## 2024-11-19 PROBLEM — I82.409 DEEP VEIN THROMBOSIS (DVT) OF LOWER EXTREMITY (H): Status: ACTIVE | Noted: 2024-01-01

## 2024-11-19 PROBLEM — R97.0 HIGH CARCINOEMBRYONIC ANTIGEN (CEA): Status: ACTIVE | Noted: 2024-10-10

## 2024-11-19 PROBLEM — R10.9 ABDOMINAL PAIN: Status: ACTIVE | Noted: 2024-10-24

## 2024-11-19 PROBLEM — C18.2 MALIGNANT NEOPLASM OF ASCENDING COLON (H): Status: ACTIVE | Noted: 2024-10-24

## 2024-11-19 PROBLEM — F17.200 TOBACCO USE DISORDER: Status: ACTIVE | Noted: 2024-01-01

## 2024-11-19 PROBLEM — S62.102A CLOSED FRACTURE OF LEFT WRIST: Status: ACTIVE | Noted: 2019-07-23

## 2024-11-19 PROBLEM — I10 HYPERTENSIVE DISORDER: Status: ACTIVE | Noted: 2024-07-17

## 2024-11-19 PROBLEM — G44.209 TENSION-TYPE HEADACHE: Status: ACTIVE | Noted: 2024-01-01

## 2024-11-19 PROBLEM — R32 URINARY INCONTINENCE: Status: ACTIVE | Noted: 2024-01-01

## 2024-11-19 PROBLEM — F32.A DEPRESSIVE DISORDER: Status: ACTIVE | Noted: 2024-01-01

## 2024-11-19 PROBLEM — E78.5 HYPERLIPIDEMIA: Status: ACTIVE | Noted: 2024-07-17

## 2024-11-19 PROBLEM — E11.9 DIABETES MELLITUS (H): Status: ACTIVE | Noted: 2024-07-17

## 2024-11-19 PROBLEM — E11.9 TYPE 2 DIABETES MELLITUS WITHOUT COMPLICATION (H): Status: ACTIVE | Noted: 2021-04-06

## 2024-11-19 PROBLEM — I26.93: Status: ACTIVE | Noted: 2024-11-01

## 2024-11-19 RX ORDER — OXYCODONE HYDROCHLORIDE 5 MG/1
5-10 TABLET ORAL EVERY 4 HOURS PRN
Qty: 60 TABLET | Refills: 0 | Status: SHIPPED | OUTPATIENT
Start: 2024-11-19

## 2024-11-19 NOTE — PROGRESS NOTES
[facility] GERIATRICS    PRIMARY CARE PROVIDER AND CLINIC:  [facility], [address] [address] [facility] [address]***  Chief Complaint   Patient presents with    ISRAEL      [place] Medical Record Number:  [id number]  Place of Service where encounter took place:  [facility] [[id number]]    Michelle Holguin  is a 64 year old  (1960), {fgsinitialoption:926336}.   HPI:    ***    CODE STATUS/ADVANCE DIRECTIVES DISCUSSION:  No Order  {CODE STATUS:177679}  ALLERGIES:   Allergies   Allergen Reactions    Codeine GI Disturbance, Nausea and Nausea and Vomiting     Other Reaction(s): GI intolerance    Other reaction(s): GI Disturbance, GI Upset    Nickel Rash     Other Reaction(s): Not available, Other (see comments)      PAST MEDICAL HISTORY:   Past Medical History:   Diagnosis Date    Diabetes (H)     Hyperlipidemia       PAST SURGICAL HISTORY:   has a past surgical history that includes IR Soft Tissue Biopsy (9/27/2024).  FAMILY HISTORY: family history is not on file.  SOCIAL HISTORY:   reports that she has been smoking. She has never used smokeless tobacco. She reports that she does not drink alcohol and does not use drugs.  Patient's living condition: {LIVES WITH (NURSING HOME):213402}    Post Discharge Medication Reconciliation Status:   MED REC REQUIRED{TIP  Click the link below to document or use med rec list, use list to pull in response :205546}  Post Medication Reconciliation Status: {MED REC LIST:166026}       Current Outpatient Medications   Medication Sig Dispense Refill    oxyCODONE (ROXICODONE) 5 MG tablet Take 1-2 tablets (5-10 mg) by mouth every 4 hours as needed for pain. 60 tablet 0    atorvastatin (LIPITOR) 40 MG tablet Take 40 mg by mouth daily.      cephALEXin (KEFLEX) 500 MG capsule Take 1 capsule (500 mg) by mouth 2 times daily 20 capsule 0    glimepiride (AMARYL) 4 MG tablet Take 4 mg by mouth every morning (before breakfast).       No current  "facility-administered medications for this visit.       ROS:  {ROS FGS:694112}    Vitals:  /69   Pulse 81   Temp 98.2  F (36.8  C)   Resp 14   Ht 1.6 m (5' 3\")   Wt 66.1 kg (145 lb 12.8 oz)   SpO2 93%   BMI 25.83 kg/m    Exam:  {Nursing home physical exam :230243}    Lab/Diagnostic data:  {fgslab:569486}    ASSESSMENT/PLAN:    {FGS DX2:241506}    Orders:  {fgsorders:684668}  ***    Electronically signed by:  DAMARIS Turner CNP ***               "

## 2024-11-19 NOTE — LETTER
11/19/2024      Michelle Holguin  [address]  [place] [facility] [address]        No notes on file      Sincerely,        DAMARIS Turner CNP

## 2024-11-21 ENCOUNTER — TELEPHONE (OUTPATIENT)
Dept: GERIATRICS | Facility: CLINIC | Age: 64
End: 2024-11-21
Payer: COMMERCIAL

## 2024-11-22 ENCOUNTER — HOSPITAL ENCOUNTER (INPATIENT)
Facility: CLINIC | Age: 64
LOS: 1 days | Discharge: HOSPICE | End: 2024-11-23
Admitting: STUDENT IN AN ORGANIZED HEALTH CARE EDUCATION/TRAINING PROGRAM
Payer: COMMERCIAL

## 2024-11-22 ENCOUNTER — APPOINTMENT (OUTPATIENT)
Dept: CT IMAGING | Facility: CLINIC | Age: 64
End: 2024-11-22
Payer: COMMERCIAL

## 2024-11-22 DIAGNOSIS — A41.9 SEPSIS, DUE TO UNSPECIFIED ORGANISM, UNSPECIFIED WHETHER ACUTE ORGAN DYSFUNCTION PRESENT (H): Primary | ICD-10-CM

## 2024-11-22 DIAGNOSIS — C18.2 MALIGNANT NEOPLASM OF ASCENDING COLON (H): ICD-10-CM

## 2024-11-22 DIAGNOSIS — E16.2 HYPOGLYCEMIA: ICD-10-CM

## 2024-11-22 LAB
ALBUMIN SERPL BCG-MCNC: 2.4 G/DL (ref 3.5–5.2)
ALBUMIN UR-MCNC: 30 MG/DL
ALP SERPL-CCNC: 247 U/L (ref 40–150)
ALT SERPL W P-5'-P-CCNC: 33 U/L (ref 0–50)
ANION GAP SERPL CALCULATED.3IONS-SCNC: 11 MMOL/L (ref 7–15)
APPEARANCE UR: ABNORMAL
AST SERPL W P-5'-P-CCNC: 68 U/L (ref 0–45)
ATRIAL RATE - MUSE: 106 BPM
BACTERIA #/AREA URNS HPF: ABNORMAL /HPF
BASE EXCESS BLDV CALC-SCNC: -3.6 MMOL/L (ref -3–3)
BASOPHILS # BLD AUTO: 0 10E3/UL (ref 0–0.2)
BASOPHILS NFR BLD AUTO: 0 %
BILIRUB SERPL-MCNC: 0.7 MG/DL
BILIRUB UR QL STRIP: NEGATIVE
BUN SERPL-MCNC: 27.5 MG/DL (ref 8–23)
CALCIUM SERPL-MCNC: 8.8 MG/DL (ref 8.8–10.4)
CHLORIDE SERPL-SCNC: 98 MMOL/L (ref 98–107)
COLOR UR AUTO: YELLOW
CREAT SERPL-MCNC: 1.35 MG/DL (ref 0.51–0.95)
DIASTOLIC BLOOD PRESSURE - MUSE: NORMAL MMHG
EGFRCR SERPLBLD CKD-EPI 2021: 43 ML/MIN/1.73M2
EOSINOPHIL # BLD AUTO: 0 10E3/UL (ref 0–0.7)
EOSINOPHIL NFR BLD AUTO: 0 %
ERYTHROCYTE [DISTWIDTH] IN BLOOD BY AUTOMATED COUNT: 20.2 % (ref 10–15)
FLUAV RNA SPEC QL NAA+PROBE: NEGATIVE
FLUBV RNA RESP QL NAA+PROBE: NEGATIVE
GLUCOSE BLDC GLUCOMTR-MCNC: 101 MG/DL (ref 70–99)
GLUCOSE BLDC GLUCOMTR-MCNC: 101 MG/DL (ref 70–99)
GLUCOSE BLDC GLUCOMTR-MCNC: 103 MG/DL (ref 70–99)
GLUCOSE BLDC GLUCOMTR-MCNC: 110 MG/DL (ref 70–99)
GLUCOSE BLDC GLUCOMTR-MCNC: 28 MG/DL (ref 70–99)
GLUCOSE BLDC GLUCOMTR-MCNC: 55 MG/DL (ref 70–99)
GLUCOSE SERPL-MCNC: 132 MG/DL (ref 70–99)
GLUCOSE UR STRIP-MCNC: NEGATIVE MG/DL
HCO3 BLDV-SCNC: 22 MMOL/L (ref 21–28)
HCO3 SERPL-SCNC: 21 MMOL/L (ref 22–29)
HCT VFR BLD AUTO: 22.2 % (ref 35–47)
HGB BLD-MCNC: 7 G/DL (ref 11.7–15.7)
HGB UR QL STRIP: ABNORMAL
IMM GRANULOCYTES # BLD: 0.2 10E3/UL
IMM GRANULOCYTES NFR BLD: 1 %
INTERPRETATION ECG - MUSE: NORMAL
KETONES UR STRIP-MCNC: NEGATIVE MG/DL
LACTATE SERPL-SCNC: 2.1 MMOL/L (ref 0.7–2)
LACTATE SERPL-SCNC: 2.2 MMOL/L (ref 0.7–2)
LEUKOCYTE ESTERASE UR QL STRIP: ABNORMAL
LIPASE SERPL-CCNC: 46 U/L (ref 13–60)
LYMPHOCYTES # BLD AUTO: 0.6 10E3/UL (ref 0.8–5.3)
LYMPHOCYTES NFR BLD AUTO: 3 %
MCH RBC QN AUTO: 23.2 PG (ref 26.5–33)
MCHC RBC AUTO-ENTMCNC: 31.5 G/DL (ref 31.5–36.5)
MCV RBC AUTO: 74 FL (ref 78–100)
MONOCYTES # BLD AUTO: 0.7 10E3/UL (ref 0–1.3)
MONOCYTES NFR BLD AUTO: 3 %
MUCOUS THREADS #/AREA URNS LPF: PRESENT /LPF
NEUTROPHILS # BLD AUTO: 20 10E3/UL (ref 1.6–8.3)
NEUTROPHILS NFR BLD AUTO: 93 %
NITRATE UR QL: NEGATIVE
NRBC # BLD AUTO: 0 10E3/UL
NRBC BLD AUTO-RTO: 0 /100
O2/TOTAL GAS SETTING VFR VENT: 0 %
OXYHGB MFR BLDV: 16 % (ref 70–75)
P AXIS - MUSE: -16 DEGREES
PCO2 BLDV: 40 MM HG (ref 40–50)
PH BLDV: 7.35 [PH] (ref 7.32–7.43)
PH UR STRIP: 5 [PH] (ref 5–7)
PLATELET # BLD AUTO: 530 10E3/UL (ref 150–450)
PO2 BLDV: 16 MM HG (ref 25–47)
POTASSIUM SERPL-SCNC: 4.3 MMOL/L (ref 3.4–5.3)
PR INTERVAL - MUSE: 152 MS
PROT SERPL-MCNC: 6.6 G/DL (ref 6.4–8.3)
QRS DURATION - MUSE: 70 MS
QT - MUSE: 324 MS
QTC - MUSE: 430 MS
R AXIS - MUSE: 69 DEGREES
RBC # BLD AUTO: 3.02 10E6/UL (ref 3.8–5.2)
RBC URINE: 60 /HPF
RSV RNA SPEC NAA+PROBE: NEGATIVE
SAO2 % BLDV: 15.8 % (ref 70–75)
SARS-COV-2 RNA RESP QL NAA+PROBE: NEGATIVE
SODIUM SERPL-SCNC: 130 MMOL/L (ref 135–145)
SP GR UR STRIP: 1.01 (ref 1–1.03)
SQUAMOUS EPITHELIAL: <1 /HPF
SYSTOLIC BLOOD PRESSURE - MUSE: NORMAL MMHG
T AXIS - MUSE: 69 DEGREES
TROPONIN T SERPL HS-MCNC: 16 NG/L
TROPONIN T SERPL HS-MCNC: 19 NG/L
UROBILINOGEN UR STRIP-MCNC: NORMAL MG/DL
VENTRICULAR RATE- MUSE: 106 BPM
WBC # BLD AUTO: 21.4 10E3/UL (ref 4–11)
WBC URINE: >182 /HPF

## 2024-11-22 PROCEDURE — 99291 CRITICAL CARE FIRST HOUR: CPT | Mod: 25

## 2024-11-22 PROCEDURE — 120N000001 HC R&B MED SURG/OB

## 2024-11-22 PROCEDURE — 83605 ASSAY OF LACTIC ACID: CPT

## 2024-11-22 PROCEDURE — 74177 CT ABD & PELVIS W/CONTRAST: CPT

## 2024-11-22 PROCEDURE — 84155 ASSAY OF PROTEIN SERUM: CPT

## 2024-11-22 PROCEDURE — 83690 ASSAY OF LIPASE: CPT

## 2024-11-22 PROCEDURE — 87088 URINE BACTERIA CULTURE: CPT

## 2024-11-22 PROCEDURE — 87149 DNA/RNA DIRECT PROBE: CPT

## 2024-11-22 PROCEDURE — 82247 BILIRUBIN TOTAL: CPT

## 2024-11-22 PROCEDURE — 96367 TX/PROPH/DG ADDL SEQ IV INF: CPT

## 2024-11-22 PROCEDURE — 36415 COLL VENOUS BLD VENIPUNCTURE: CPT

## 2024-11-22 PROCEDURE — 96368 THER/DIAG CONCURRENT INF: CPT

## 2024-11-22 PROCEDURE — 84484 ASSAY OF TROPONIN QUANT: CPT

## 2024-11-22 PROCEDURE — 96365 THER/PROPH/DIAG IV INF INIT: CPT

## 2024-11-22 PROCEDURE — 258N000003 HC RX IP 258 OP 636

## 2024-11-22 PROCEDURE — 250N000011 HC RX IP 250 OP 636

## 2024-11-22 PROCEDURE — 87186 SC STD MICRODIL/AGAR DIL: CPT

## 2024-11-22 PROCEDURE — 250N000013 HC RX MED GY IP 250 OP 250 PS 637

## 2024-11-22 PROCEDURE — 82962 GLUCOSE BLOOD TEST: CPT

## 2024-11-22 PROCEDURE — 93010 ELECTROCARDIOGRAM REPORT: CPT

## 2024-11-22 PROCEDURE — 85041 AUTOMATED RBC COUNT: CPT

## 2024-11-22 PROCEDURE — 258N000001 HC RX 258

## 2024-11-22 PROCEDURE — 250N000009 HC RX 250

## 2024-11-22 PROCEDURE — 96375 TX/PRO/DX INJ NEW DRUG ADDON: CPT

## 2024-11-22 PROCEDURE — 96361 HYDRATE IV INFUSION ADD-ON: CPT

## 2024-11-22 PROCEDURE — 85004 AUTOMATED DIFF WBC COUNT: CPT

## 2024-11-22 PROCEDURE — 87637 SARSCOV2&INF A&B&RSV AMP PRB: CPT

## 2024-11-22 PROCEDURE — 82805 BLOOD GASES W/O2 SATURATION: CPT

## 2024-11-22 PROCEDURE — 99223 1ST HOSP IP/OBS HIGH 75: CPT

## 2024-11-22 PROCEDURE — 99285 EMERGENCY DEPT VISIT HI MDM: CPT | Mod: 25

## 2024-11-22 PROCEDURE — 93005 ELECTROCARDIOGRAM TRACING: CPT

## 2024-11-22 PROCEDURE — 81001 URINALYSIS AUTO W/SCOPE: CPT

## 2024-11-22 PROCEDURE — 84450 TRANSFERASE (AST) (SGOT): CPT

## 2024-11-22 RX ORDER — KETOROLAC TROMETHAMINE 15 MG/ML
15 INJECTION, SOLUTION INTRAMUSCULAR; INTRAVENOUS ONCE
Status: COMPLETED | OUTPATIENT
Start: 2024-11-22 | End: 2024-11-22

## 2024-11-22 RX ORDER — HYDROMORPHONE HYDROCHLORIDE 1 MG/ML
0.5 INJECTION, SOLUTION INTRAMUSCULAR; INTRAVENOUS; SUBCUTANEOUS
Status: DISCONTINUED | OUTPATIENT
Start: 2024-11-22 | End: 2024-11-22

## 2024-11-22 RX ORDER — GLIMEPIRIDE 4 MG/1
4 TABLET ORAL DAILY
Status: ON HOLD | COMMUNITY
End: 2024-11-23

## 2024-11-22 RX ORDER — CIPROFLOXACIN 500 MG/1
TABLET, FILM COATED ORAL
COMMUNITY
Start: 2024-07-17 | End: 2024-11-22

## 2024-11-22 RX ORDER — ALBUTEROL SULFATE 90 UG/1
2 INHALANT RESPIRATORY (INHALATION) EVERY 4 HOURS PRN
Status: ON HOLD | COMMUNITY
Start: 2024-02-19 | End: 2024-11-23

## 2024-11-22 RX ORDER — METRONIDAZOLE 500 MG/100ML
500 INJECTION, SOLUTION INTRAVENOUS ONCE
Status: COMPLETED | OUTPATIENT
Start: 2024-11-22 | End: 2024-11-22

## 2024-11-22 RX ORDER — HALOPERIDOL 5 MG/ML
1 INJECTION INTRAMUSCULAR
Status: DISCONTINUED | OUTPATIENT
Start: 2024-11-22 | End: 2024-11-23 | Stop reason: HOSPADM

## 2024-11-22 RX ORDER — MINERAL OIL/HYDROPHIL PETROLAT
OINTMENT (GRAM) TOPICAL
Status: DISCONTINUED | OUTPATIENT
Start: 2024-11-22 | End: 2024-11-23 | Stop reason: HOSPADM

## 2024-11-22 RX ORDER — IOPAMIDOL 755 MG/ML
70 INJECTION, SOLUTION INTRAVASCULAR ONCE
Status: COMPLETED | OUTPATIENT
Start: 2024-11-22 | End: 2024-11-22

## 2024-11-22 RX ORDER — AMOXICILLIN 250 MG
1 CAPSULE ORAL 2 TIMES DAILY PRN
Status: DISCONTINUED | OUTPATIENT
Start: 2024-11-22 | End: 2024-11-23 | Stop reason: HOSPADM

## 2024-11-22 RX ORDER — DEXTROSE MONOHYDRATE 25 G/50ML
INJECTION, SOLUTION INTRAVENOUS
Status: COMPLETED
Start: 2024-11-22 | End: 2024-11-22

## 2024-11-22 RX ORDER — PROCHLORPERAZINE MALEATE 5 MG/1
5 TABLET ORAL EVERY 6 HOURS PRN
Status: DISCONTINUED | OUTPATIENT
Start: 2024-11-22 | End: 2024-11-23 | Stop reason: HOSPADM

## 2024-11-22 RX ORDER — DEXTROSE MONOHYDRATE 25 G/50ML
25-50 INJECTION, SOLUTION INTRAVENOUS
Status: DISCONTINUED | OUTPATIENT
Start: 2024-11-22 | End: 2024-11-23 | Stop reason: HOSPADM

## 2024-11-22 RX ORDER — DEXTROSE, SODIUM CHLORIDE, SODIUM LACTATE, POTASSIUM CHLORIDE, AND CALCIUM CHLORIDE 5; .6; .31; .03; .02 G/100ML; G/100ML; G/100ML; G/100ML; G/100ML
INJECTION, SOLUTION INTRAVENOUS CONTINUOUS
Status: DISCONTINUED | OUTPATIENT
Start: 2024-11-22 | End: 2024-11-23 | Stop reason: HOSPADM

## 2024-11-22 RX ORDER — ONDANSETRON 4 MG/1
4 TABLET, ORALLY DISINTEGRATING ORAL EVERY 6 HOURS PRN
Status: DISCONTINUED | OUTPATIENT
Start: 2024-11-22 | End: 2024-11-23 | Stop reason: HOSPADM

## 2024-11-22 RX ORDER — HYDROMORPHONE HYDROCHLORIDE 2 MG/1
2-4 TABLET ORAL
COMMUNITY
Start: 2024-11-18 | End: 2024-11-22

## 2024-11-22 RX ORDER — ONDANSETRON 4 MG/1
4 TABLET, FILM COATED ORAL EVERY 8 HOURS PRN
Status: ON HOLD | COMMUNITY
Start: 2024-10-17 | End: 2024-11-23

## 2024-11-22 RX ORDER — SENNOSIDES 8.6 MG
1 TABLET ORAL 2 TIMES DAILY PRN
Status: DISCONTINUED | OUTPATIENT
Start: 2024-11-22 | End: 2024-11-23 | Stop reason: HOSPADM

## 2024-11-22 RX ORDER — HYDROMORPHONE HYDROCHLORIDE 1 MG/ML
1 SOLUTION ORAL
Status: DISCONTINUED | OUTPATIENT
Start: 2024-11-22 | End: 2024-11-23 | Stop reason: HOSPADM

## 2024-11-22 RX ORDER — CEFEPIME HYDROCHLORIDE 1 G/1
1 INJECTION, POWDER, FOR SOLUTION INTRAMUSCULAR; INTRAVENOUS ONCE
Status: COMPLETED | OUTPATIENT
Start: 2024-11-22 | End: 2024-11-22

## 2024-11-22 RX ORDER — CARBOXYMETHYLCELLULOSE SODIUM 5 MG/ML
1-2 SOLUTION/ DROPS OPHTHALMIC
Status: DISCONTINUED | OUTPATIENT
Start: 2024-11-22 | End: 2024-11-23 | Stop reason: HOSPADM

## 2024-11-22 RX ORDER — ATROPINE SULFATE 10 MG/ML
2 SOLUTION/ DROPS OPHTHALMIC EVERY 4 HOURS PRN
Status: DISCONTINUED | OUTPATIENT
Start: 2024-11-22 | End: 2024-11-23 | Stop reason: HOSPADM

## 2024-11-22 RX ORDER — SIMETHICONE 80 MG
80 TABLET,CHEWABLE ORAL 4 TIMES DAILY PRN
Status: ON HOLD | COMMUNITY
Start: 2024-11-18 | End: 2024-11-23

## 2024-11-22 RX ORDER — ACETAMINOPHEN 325 MG/1
650 TABLET ORAL EVERY 6 HOURS PRN
Status: DISCONTINUED | OUTPATIENT
Start: 2024-11-22 | End: 2024-11-23 | Stop reason: HOSPADM

## 2024-11-22 RX ORDER — NALOXONE HYDROCHLORIDE 0.4 MG/ML
0.2 INJECTION, SOLUTION INTRAMUSCULAR; INTRAVENOUS; SUBCUTANEOUS
Status: DISCONTINUED | OUTPATIENT
Start: 2024-11-22 | End: 2024-11-23 | Stop reason: HOSPADM

## 2024-11-22 RX ORDER — LORAZEPAM 2 MG/ML
1 INJECTION INTRAMUSCULAR
Status: DISCONTINUED | OUTPATIENT
Start: 2024-11-22 | End: 2024-11-23 | Stop reason: HOSPADM

## 2024-11-22 RX ORDER — CEFUROXIME AXETIL 500 MG/1
1 TABLET ORAL
COMMUNITY
Start: 2024-10-11 | End: 2024-11-22

## 2024-11-22 RX ORDER — METRONIDAZOLE 500 MG/1
TABLET ORAL
COMMUNITY
Start: 2024-07-17 | End: 2024-11-22

## 2024-11-22 RX ORDER — NALOXONE HYDROCHLORIDE 0.4 MG/ML
0.1 INJECTION, SOLUTION INTRAMUSCULAR; INTRAVENOUS; SUBCUTANEOUS
Status: DISCONTINUED | OUTPATIENT
Start: 2024-11-22 | End: 2024-11-23 | Stop reason: HOSPADM

## 2024-11-22 RX ORDER — POLYETHYLENE GLYCOL-3350 AND ELECTROLYTES 236; 6.74; 5.86; 2.97; 22.74 G/274.31G; G/274.31G; G/274.31G; G/274.31G; G/274.31G
POWDER, FOR SOLUTION ORAL
COMMUNITY
Start: 2024-10-23 | End: 2024-11-22

## 2024-11-22 RX ORDER — CALCIUM CARBONATE 500 MG/1
1000 TABLET, CHEWABLE ORAL 4 TIMES DAILY PRN
Status: DISCONTINUED | OUTPATIENT
Start: 2024-11-22 | End: 2024-11-23 | Stop reason: HOSPADM

## 2024-11-22 RX ORDER — AMOXICILLIN 250 MG
2 CAPSULE ORAL 2 TIMES DAILY PRN
Status: DISCONTINUED | OUTPATIENT
Start: 2024-11-22 | End: 2024-11-23 | Stop reason: HOSPADM

## 2024-11-22 RX ORDER — ACETAMINOPHEN 650 MG/1
650 SUPPOSITORY RECTAL EVERY 6 HOURS PRN
Status: DISCONTINUED | OUTPATIENT
Start: 2024-11-22 | End: 2024-11-23 | Stop reason: HOSPADM

## 2024-11-22 RX ORDER — ACETAMINOPHEN 650 MG/20.3ML
650 LIQUID ORAL EVERY 6 HOURS PRN
Status: DISCONTINUED | OUTPATIENT
Start: 2024-11-22 | End: 2024-11-23 | Stop reason: HOSPADM

## 2024-11-22 RX ORDER — SALIVA STIMULANT COMB. NO.3
1 SPRAY, NON-AEROSOL (ML) MUCOUS MEMBRANE
Status: DISCONTINUED | OUTPATIENT
Start: 2024-11-22 | End: 2024-11-23 | Stop reason: HOSPADM

## 2024-11-22 RX ORDER — HYDROMORPHONE HYDROCHLORIDE 2 MG/1
2 TABLET ORAL
Status: DISCONTINUED | OUTPATIENT
Start: 2024-11-22 | End: 2024-11-23 | Stop reason: HOSPADM

## 2024-11-22 RX ORDER — HYDROMORPHONE HYDROCHLORIDE 1 MG/ML
0.5 INJECTION, SOLUTION INTRAMUSCULAR; INTRAVENOUS; SUBCUTANEOUS
Status: DISCONTINUED | OUTPATIENT
Start: 2024-11-22 | End: 2024-11-23 | Stop reason: HOSPADM

## 2024-11-22 RX ORDER — MORPHINE SULFATE 2 MG/ML
1 INJECTION, SOLUTION INTRAMUSCULAR; INTRAVENOUS
Status: DISCONTINUED | OUTPATIENT
Start: 2024-11-22 | End: 2024-11-23

## 2024-11-22 RX ORDER — ONDANSETRON 2 MG/ML
4 INJECTION INTRAMUSCULAR; INTRAVENOUS EVERY 6 HOURS PRN
Status: DISCONTINUED | OUTPATIENT
Start: 2024-11-22 | End: 2024-11-23 | Stop reason: HOSPADM

## 2024-11-22 RX ORDER — LIDOCAINE 50 MG/G
1 PATCH TOPICAL EVERY 24 HOURS
Status: ON HOLD | COMMUNITY
Start: 2024-11-18 | End: 2024-11-23

## 2024-11-22 RX ORDER — ACETAMINOPHEN 500 MG
1000 TABLET ORAL EVERY 6 HOURS PRN
Status: ON HOLD | COMMUNITY
Start: 2024-11-18 | End: 2024-11-23

## 2024-11-22 RX ORDER — LIDOCAINE 40 MG/G
CREAM TOPICAL
Status: DISCONTINUED | OUTPATIENT
Start: 2024-11-22 | End: 2024-11-23 | Stop reason: HOSPADM

## 2024-11-22 RX ORDER — BISACODYL 10 MG
10 SUPPOSITORY, RECTAL RECTAL
Status: DISCONTINUED | OUTPATIENT
Start: 2024-11-25 | End: 2024-11-23 | Stop reason: HOSPADM

## 2024-11-22 RX ORDER — CARBOXYMETHYLCELLULOSE SODIUM 5 MG/ML
1-2 SOLUTION/ DROPS OPHTHALMIC
Status: DISCONTINUED | OUTPATIENT
Start: 2024-11-22 | End: 2024-11-22

## 2024-11-22 RX ORDER — LORAZEPAM 1 MG/1
1 TABLET ORAL
Status: DISCONTINUED | OUTPATIENT
Start: 2024-11-22 | End: 2024-11-23 | Stop reason: HOSPADM

## 2024-11-22 RX ORDER — HYDROMORPHONE HYDROCHLORIDE 1 MG/ML
0.3 INJECTION, SOLUTION INTRAMUSCULAR; INTRAVENOUS; SUBCUTANEOUS
Status: DISCONTINUED | OUTPATIENT
Start: 2024-11-22 | End: 2024-11-23 | Stop reason: HOSPADM

## 2024-11-22 RX ORDER — DULOXETIN HYDROCHLORIDE 30 MG/1
30 CAPSULE, DELAYED RELEASE ORAL DAILY
Status: ON HOLD | COMMUNITY
Start: 2024-11-18 | End: 2024-11-23

## 2024-11-22 RX ORDER — NICOTINE POLACRILEX 4 MG
15-30 LOZENGE BUCCAL
Status: DISCONTINUED | OUTPATIENT
Start: 2024-11-22 | End: 2024-11-23 | Stop reason: HOSPADM

## 2024-11-22 RX ORDER — FLUCONAZOLE 150 MG/1
TABLET ORAL
COMMUNITY
Start: 2024-03-26 | End: 2024-11-22

## 2024-11-22 RX ORDER — HYDROMORPHONE HYDROCHLORIDE 1 MG/ML
2 SOLUTION ORAL
Status: DISCONTINUED | OUTPATIENT
Start: 2024-11-22 | End: 2024-11-23 | Stop reason: HOSPADM

## 2024-11-22 RX ORDER — DEXTROSE MONOHYDRATE 25 G/50ML
50 INJECTION, SOLUTION INTRAVENOUS CONTINUOUS
Status: DISCONTINUED | OUTPATIENT
Start: 2024-11-22 | End: 2024-11-23

## 2024-11-22 RX ORDER — LIDOCAINE 4 G/G
1 PATCH TOPICAL EVERY 24 HOURS
Status: DISCONTINUED | OUTPATIENT
Start: 2024-11-23 | End: 2024-11-23 | Stop reason: HOSPADM

## 2024-11-22 RX ADMIN — METRONIDAZOLE 500 MG: 5 INJECTION, SOLUTION INTRAVENOUS at 16:52

## 2024-11-22 RX ADMIN — KETOROLAC TROMETHAMINE 15 MG: 15 INJECTION, SOLUTION INTRAMUSCULAR; INTRAVENOUS at 22:12

## 2024-11-22 RX ADMIN — DEXTROSE MONOHYDRATE 50 ML: 25 INJECTION, SOLUTION INTRAVENOUS at 19:27

## 2024-11-22 RX ADMIN — IOPAMIDOL 70 ML: 755 INJECTION, SOLUTION INTRAVENOUS at 15:51

## 2024-11-22 RX ADMIN — SODIUM CHLORIDE, SODIUM LACTATE, POTASSIUM CHLORIDE, CALCIUM CHLORIDE AND DEXTROSE MONOHYDRATE: 5; 600; 310; 30; 20 INJECTION, SOLUTION INTRAVENOUS at 17:18

## 2024-11-22 RX ADMIN — CEFEPIME 1 G: 1 INJECTION, POWDER, FOR SOLUTION INTRAMUSCULAR; INTRAVENOUS at 15:07

## 2024-11-22 RX ADMIN — HYDROMORPHONE HYDROCHLORIDE 0.5 MG: 1 INJECTION, SOLUTION INTRAMUSCULAR; INTRAVENOUS; SUBCUTANEOUS at 21:43

## 2024-11-22 RX ADMIN — SODIUM CHLORIDE, POTASSIUM CHLORIDE, SODIUM LACTATE AND CALCIUM CHLORIDE 1000 ML: 600; 310; 30; 20 INJECTION, SOLUTION INTRAVENOUS at 15:07

## 2024-11-22 RX ADMIN — SODIUM CHLORIDE, POTASSIUM CHLORIDE, SODIUM LACTATE AND CALCIUM CHLORIDE 1000 ML: 600; 310; 30; 20 INJECTION, SOLUTION INTRAVENOUS at 17:05

## 2024-11-22 RX ADMIN — ACETAMINOPHEN 650 MG: 325 SUPPOSITORY RECTAL at 15:07

## 2024-11-22 RX ADMIN — SODIUM CHLORIDE 58 ML: 9 INJECTION, SOLUTION INTRAVENOUS at 15:52

## 2024-11-22 ASSESSMENT — ACTIVITIES OF DAILY LIVING (ADL)
ADLS_ACUITY_SCORE: 0

## 2024-11-22 ASSESSMENT — COLUMBIA-SUICIDE SEVERITY RATING SCALE - C-SSRS: IS THE PATIENT NOT ABLE TO COMPLETE C-SSRS: UNABLE TO VERBALIZE

## 2024-11-22 NOTE — ED TRIAGE NOTES
Pt comes from TCU, call placed for low blood glucose. BG have been running low, blood glucose in the 50 pt was given glucagon at TCU. On scene BG was 78, pt give 250 of dextrose, last  per EMS. Pt given 0.5mg narcan X2, pt was a little more responsive after each dose.

## 2024-11-22 NOTE — TELEPHONE ENCOUNTER
[facility] TELEPHONE ENCOUNTER    Chief Complaint   Patient presents with    Results     Low blood sugar       Michelle Holguin is a 64 year old  (1960),Nurse called today to report: Blood sugar was 65 mg/dL. Patient given orange juice minutes ago. Recheck blood sugar 54 mg/dL.    ASSESSMENT/PLAN  Writer inquired if patient had any glucose gel in patient's medication list. Nurse said no. Writer asked if there were any glucose gel/dextrose/medications in-house for hypoglycemia protocol. Nurse said he will check. Writer waited on the phone for 7 minutes and had no answer.   Awaiting response.    Electronically signed by:   DAMARIS Lopez CNP

## 2024-11-22 NOTE — MEDICATION SCRIBE - ADMISSION MEDICATION HISTORY
Medication Scribe Admission Medication History    Admission medication history is complete. The information provided in this note is only as accurate as the sources available at the time of the update.    Information Source(s): Facility ([facility] medication list/MAR via N/A    Pertinent Information: MAR sent with patient from [facility]-Glimepiride was held this AM, patient last received 4 mg 11/21 but that dose was discontinued and changed to 3 mg on 11/22.     Changes made to PTA medication list:  Added: None  Deleted: Hydromorphone 2 mg, Gavilyte  Changed: Glimepiride 3 mg every day to 4 mg qd    Allergies reviewed with patient and updates made in EHR: yes    Medication History Completed By: Renetta Bianchi 11/22/2024 4:30 PM    PTA Med List   Medication Sig Note Last Dose/Taking    acetaminophen (TYLENOL) 500 MG tablet Take 1,000 mg by mouth every 6 hours as needed for pain.  Unknown    albuterol (PROAIR HFA/PROVENTIL HFA/VENTOLIN HFA) 108 (90 Base) MCG/ACT inhaler Inhale 2 puffs into the lungs every 4 hours as needed for shortness of breath.  Unknown    apixaban ANTICOAGULANT (ELIQUIS) 5 MG tablet Take 5 mg by mouth 2 times daily.  11/22/2024 Morning    atorvastatin (LIPITOR) 40 MG tablet Take 40 mg by mouth daily.  11/22/2024 Morning    DULoxetine (CYMBALTA) 30 MG capsule Take 30 mg by mouth daily.  11/21/2024 Bedtime    Glimepiride 3 MG TABS Take 3 mg by mouth daily. Hold if BGT is less than 50 11/22/2024: Dose held today-Received 4 mg yesterday but that strength was discontinued and changed to 3 mg Unknown    lidocaine (LIDODERM) 5 % patch Place 1 patch onto the skin every 24 hours.  11/21/2024 Morning    nicotine (NICODERM CQ) 7 MG/24HR 24 hr patch Place 1 patch onto the skin.  11/22/2024 Morning    ondansetron (ZOFRAN) 4 MG tablet Take 4 mg by mouth every 8 hours as needed for nausea or vomiting.  11/22/2024 Morning    simethicone (MYLICON) 80 MG chewable tablet Take 80 mg by mouth as needed.  Unknown     oxyCODONE (ROXICODONE) 5 MG tablet Take 1-2 tablets (5-10 mg) by mouth every 4 hours as needed for pain. (Patient taking differently: Take 1-2 tablets by mouth every 4 hours as needed for pain.)  11/22/2024 Morning

## 2024-11-22 NOTE — ED PROVIDER NOTES
"[facility]  Emergency Department Visit Note    PATIENT:  Michelle Holguin     64 year old     female      [id number]    Chief complaint:  Chief Complaint   Patient presents with    Altered Mental Status     Pt comes from TCU, call placed for low blood glucose. BG have been running low, blood glucose in the 50 pt was given glucagon at TCU. On scene BG was 78, pt give 250 of dextrose, last  per EMS. Pt given 0.5mg narcan X2, pt was a little more responsive after each dose.           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    Coming from TCU where she has been for the past several days after recent admission.  Per TCU staff, she was noted to be altered this morning, found blood sugar in the 50s.  They administered glucagon which improved to 78.  For EMS she was still hypoglycemic so they administered 250 cc of D10 which improved blood sugar to 143.  No significant improvement in mental status, they attempted 0.5 mg (?IV) naloxone x 2 with equivocal effect.    I reviewed discharge summary 11/18/2024.  Admitted through [facility] for single submental PE, right lower extremity DVT, malignant neoplasm of ascending colon, emphysema.  Discharged on that date to the TCU.    I reviewed progress note 11/5/2024.  Colorectal surgery progress note noted extensive pelvic disease.  Left-sided diverting loop transverse colostomy and right sided diverting loop ileostomy performed.  Subsequently reviewed colorectal surgery follow-up 11/17 note, at that time 12 days postop.  No other apparent concerns at that time from the colostomy.    Limited history available from patient.  She denies headache, fever, chest pain, dyspnea, vomiting, abdominal pain \"except when you mess with me\".      Review of Systems:  As in HPI above    /52   Pulse 101   Temp (!) 102  F " (38.9  C) (Rectal)   Resp 20   Wt 65.7 kg (144 lb 12.8 oz)   SpO2 97%   BMI 25.65 kg/m        Physical Exam:  Constitutional: Laying in hospital bed, eyes closed, appears unwell, spontaneous respirations, occasional moaning, slow to follow commands though does so with encouragement.  Oriented to person, that we are in hospital, and the year.  HEENT: Eyes closed but opens to command, pupils 2 mm, equal, round, reactive to light bilaterally, sclera anicteric, no external head trauma, dry mucous membranes  Neck: able to fully range  Cardiovascular: tachycardic, regular rhythm, and no murmurs, rubs, or extra heart sounds  Pulmonary: breathing comfortably on room air and lungs clear to auscultation bilaterally  Abdominal: Colostomy sites draining brown stool.  No obvious hematochezia or melena.  Surgical sites with staples without surrounding erythema or discharge.  Generally tender.  Mildly distended.  No rebound or guarding.  Genitourinary: normal external female genitalia and clear mucousy vaginal discharge  Extremities/MSK: no peripheral edema  Skin: warm, dry  Neurologic: Limited assessment due to somnolence though follows commands all 4 extremities with apparent equal strength all 4 extremities  Psychiatric: Limited assessment      MDM:  Patient is a 64 year old female with above history presenting for evaluation of altered mental status.    Vitals notable for fever, tachycardia, though normotensive and satting well on room air. Exam notable for decreased alertness and mental status though no obvious focal deficit.    Differential is extremely broad and complicated by recent operative invention, recent PE diagnosis, and underlying comorbidities.  No clearly localizing infectious signs or symptoms though obviously concern for infection given fever and tachycardia.  No obvious focal deficit concerning for CVA to explain her altered mental status.  Hypoglycemia earlier could have been contributing though does not  appear to be the case currently.    Labs, urine, head CT, chest x-ray, abdomen/pelvis CT, broad-spectrum antibiotics, fluids, acetaminophen.    Disposition likely admission. Remainder of ED course below.    ED COURSE:  ED Course as of 11/22/24 2243 Fri Nov 22, 2024   1451 CBC with platelets differential(!)  Worsening leukocytosis even from 4 hours ago consistent with infectious process, antibiotics and septic workup ordered.  Anemic, somewhat improved from earlier today though downtrending from recent admission.  No obvious source of blood loss currently so holding off on transfusion for now.  Thrombocytosis likely acute phase.   1453 Blood gas venous(!)  No acidemia. pCO2 normal.   1504 Lactic Acid(!): 2.1  Noted, fluid bolus ordered, repeat lactate ordered   1510 Creatinine(!): 1.35  Unclear baseline, seems to be somewhere between 1 and 1.3 over the recent mission.  New mild hyponatremia.  Elevated alkaline phosphatase and AST of unclear clinical significance but likely related to hepatic metastasis.  Bilirubin normal reducing concern for an acute obstructive hepatobiliary pathology.  Calcium normal.   1512 Lipase: 46  Pancreatitis unlikely   1518 EKG 12 lead  ECG:  - Ventricular rate 106 bpm, regular  - [place], QRS, QT intervals normal  - Axis normal  - no ST segment or T wave changes concerning for acute ischemia  - Comparison to prior ECGs: None available for comparison  - My independent interpretation: Sinus tachycardia with widespread ST elevations potentially concerning for pericarditis.  Troponin added. Not having chest pain. Given low pre-test probability with new hemoglobin drop will hold off on aspirin for now.   1544 UA Macroscopic with reflex to Microscopic and Culture(!)  Probably consistent with UTI, but does have bilateral ureteral stents (this is per review of recent admission at [facility] - placed 10/26 for malignant obstruction). Antibiotics already ordered.   1546 Troponin T, High Sensitivity(!):  16  Likely type II MI in the setting of acute illness.  Will trend.   1603 Influenza A/B, RSV and SARS-CoV2 PCR (COVID-19) Nose  Viral studies negative   1604 Head CT w/o contrast  No clear acute intracranial process by CT   1621 CT Abdomen Pelvis w Contrast  Numerous concerning acute on chronic findings that may be contributing to presentation.  Will reach out to [facility] team.   1631 XR Chest Port 1 View  Mild linear opacities right base potentially concerning for infection versus atelectasis.  Receiving antibiotics.  No atypical coverage though suspect this is an unlikely primary driving factor for her symptoms.   1700 Lactic Acid(!): 2.2  Lactate is going up.  Most recent blood pressure is downtrending, though tachycardia has improved.  Second liter of crystalloid ordered.   1708 GLUCOSE BY METER POCT(!): 55  Persistently hypoglycemic, starting on dextrose infusion as she is not taking oral intake.   1710 Patient reassessed, perfusion reassessment performed.  No significant change, perhaps slightly more alert and still answering questions.  Tachycardia improved.   1721 Troponin T, High Sensitivity(!): 19  Repeat not significantly increased, still low concern for ACS and more likely type II MI   1912 I spoke with [facility], per their colorectal surgeons they reviewed imaging and would be nonoperative from a surgical standpoint.   1955 GLUCOSE BY METER POCT(!!): 28  Additional amp of D50 given.  Increasing rate of D5 LR.   2000 (Note timing of this note is approximate).  I spoke with Dr. Garland with oncology at [facility].  So likely primary tumor is unresectable.  Did report that based on various genetic markers there is roughly 50% chance of potential dramatic response to the immunotherapy, though difficult to say what patient's response may be.   2043 I had long discussion with 2 of patient's daughters regarding goals of care.  We discussed my clinical impression that patient is acutely and chronically ill.  Even without  this acute decompensation with sepsis from likely intra-abdominal source, she would have a very long palliative immunotherapy based regimen, though again currently experiencing fairly significant setback.    We discussed patient's goals.  She has expressed to family over the past several days that she is wanting to go home.  As such, it seems aligned with patient's goals for more of a comfort focused approach.  I discussed options with family including aggressive cares which would likely need prolonged hospitalization for management of sepsis, and even then no guarantee she would be healthy enough to get to a point where she could tolerate immunotherapy.  Anticipate she would likely need tube feeding as she is not taking oral intake currently.    Ultimately, family moving toward more of a comfort focused approach.  They would like to try to get her home on hospice.  Somewhat challenging given we are approaching the weekend with how quickly we can coordinate hospice at home.  No great way to get patient home tonight safely particularly given recurrent hypoglycemia.  We discussed admission to the hospital here for temporizing measures and management of sepsis and hypoglycemia pending pursuance of hospice and other palliative focused cares.  Will discuss case with hospitalist.   2235 Again discussed with patient's daughters goals of care.  They ultimately clarified that they would like to proceed to comfort focused cares.  They would still like glucose containing fluids so that she does not become excessively hypoglycemic overnight, which I think is reasonable, but would like to stop antibiotics and again other aggressive cares.  They reaffirmed patient would not want procedural intervention.    I spoke with hospitalist team who has graciously agreed to accept patient to their service with some select therapies including glucose containing fluids.  Bed requested.  Comfort care order set placed.   2240    Critical Care  Addendum  My initial assessment, based on my review of nursing observations, review of vital signs, focused history, physical exam, review of cardiac rhythm monitor, 12 lead ECG analysis, discussion with oncology and hospital medicine, interpretation of labs and imaging , and goals of care discussions with patient's family, established a high suspicion that Michelle Holguin has sepsis with indication for early goal-directed therapy, altered mental status, and a high risk electrolyte abnormality, which requires immediate intervention, and therefore she is critically ill.     After the initial assessment, the care team initiated multiple lab tests, initiated IV fluid administration, initiated medication therapy with antibiotics, IV fluid rehydration, IV analgesics, and consulted with [facility] colorectal surgery and oncology to provide stabilization care. Due to the critical nature of this patient, I reassessed vital signs, physical exam, review of cardiac rhythm monitor, interpretation of labs and imaging, and mental status multiple times prior to her disposition.     Time also spent performing documentation, discussion with family to obtain medical information for decision making, reviewing test results, discussion with consultants, and coordination of care.     Critical care time (excluding teaching time and procedures): 80 minutes.       Encounter Diagnoses:  Final diagnoses:   Sepsis, due to unspecified organism, unspecified whether acute organ dysfunction present (H)   Hypoglycemia       Final disposition: [facility] admission    Slade Kumar MD  11/22/2024  2:38 PM   Emergency Medicine  [facility]      Addendum: updated final dispo       Slade Kumar MD  11/22/24 1068

## 2024-11-23 ENCOUNTER — HOSPITAL ENCOUNTER (INPATIENT)
Facility: CLINIC | Age: 64
End: 2024-11-23
Attending: STUDENT IN AN ORGANIZED HEALTH CARE EDUCATION/TRAINING PROGRAM | Admitting: INTERNAL MEDICINE
Payer: COMMERCIAL

## 2024-11-23 VITALS
WEIGHT: 149.25 LBS | OXYGEN SATURATION: 94 % | TEMPERATURE: 101 F | HEART RATE: 126 BPM | RESPIRATION RATE: 32 BRPM | DIASTOLIC BLOOD PRESSURE: 45 MMHG | HEIGHT: 63 IN | SYSTOLIC BLOOD PRESSURE: 107 MMHG | BODY MASS INDEX: 26.45 KG/M2

## 2024-11-23 DIAGNOSIS — Z51.5 HOSPICE CARE: Primary | ICD-10-CM

## 2024-11-23 LAB
ENTEROCOCCUS FAECALIS: DETECTED
ENTEROCOCCUS FAECIUM: NOT DETECTED
GLUCOSE BLDC GLUCOMTR-MCNC: 130 MG/DL (ref 70–99)
GLUCOSE BLDC GLUCOMTR-MCNC: 151 MG/DL (ref 70–99)
LISTERIA SPECIES (DETECTED/NOT DETECTED): NOT DETECTED
STAPHYLOCOCCUS AUREUS: NOT DETECTED
STAPHYLOCOCCUS EPIDERMIDIS: NOT DETECTED
STAPHYLOCOCCUS LUGDUNENSIS: NOT DETECTED
STAPHYLOCOCCUS SPECIES: NOT DETECTED
STREPTOCOCCUS AGALACTIAE: NOT DETECTED
STREPTOCOCCUS ANGINOSUS GROUP: NOT DETECTED
STREPTOCOCCUS PNEUMONIAE: NOT DETECTED
STREPTOCOCCUS PYOGENES: NOT DETECTED
STREPTOCOCCUS SPECIES: NOT DETECTED

## 2024-11-23 PROCEDURE — 250N000011 HC RX IP 250 OP 636

## 2024-11-23 PROCEDURE — 250N000011 HC RX IP 250 OP 636: Performed by: STUDENT IN AN ORGANIZED HEALTH CARE EDUCATION/TRAINING PROGRAM

## 2024-11-23 PROCEDURE — 110N000001 HC R&B HOSPICE

## 2024-11-23 PROCEDURE — 99207 PR NO BILLABLE SERVICE THIS VISIT: CPT | Mod: GV | Performed by: STUDENT IN AN ORGANIZED HEALTH CARE EDUCATION/TRAINING PROGRAM

## 2024-11-23 PROCEDURE — 250N000013 HC RX MED GY IP 250 OP 250 PS 637: Performed by: STUDENT IN AN ORGANIZED HEALTH CARE EDUCATION/TRAINING PROGRAM

## 2024-11-23 PROCEDURE — 99239 HOSP IP/OBS DSCHRG MGMT >30: CPT | Performed by: STUDENT IN AN ORGANIZED HEALTH CARE EDUCATION/TRAINING PROGRAM

## 2024-11-23 PROCEDURE — 99207 PR NO BILLABLE SERVICE THIS VISIT: CPT | Performed by: STUDENT IN AN ORGANIZED HEALTH CARE EDUCATION/TRAINING PROGRAM

## 2024-11-23 PROCEDURE — 250N000013 HC RX MED GY IP 250 OP 250 PS 637

## 2024-11-23 PROCEDURE — 120N000001 HC R&B MED SURG/OB

## 2024-11-23 PROCEDURE — 110N000005 HC R&B HOSPICE, ACCENT

## 2024-11-23 RX ORDER — CARBOXYMETHYLCELLULOSE SODIUM 5 MG/ML
1-2 SOLUTION/ DROPS OPHTHALMIC
DISCHARGE
Start: 2024-11-23

## 2024-11-23 RX ORDER — BISACODYL 10 MG
10 SUPPOSITORY, RECTAL RECTAL
Status: DISCONTINUED | OUTPATIENT
Start: 2024-11-26 | End: 2024-11-26

## 2024-11-23 RX ORDER — NALOXONE HYDROCHLORIDE 0.4 MG/ML
0.2 INJECTION, SOLUTION INTRAMUSCULAR; INTRAVENOUS; SUBCUTANEOUS
Status: DISCONTINUED | OUTPATIENT
Start: 2024-11-23 | End: 2024-11-26

## 2024-11-23 RX ORDER — LORAZEPAM 1 MG/1
1 TABLET ORAL
Status: SHIPPED | DISCHARGE
Start: 2024-11-23

## 2024-11-23 RX ORDER — SALIVA STIMULANT COMB. NO.3
2 SPRAY, NON-AEROSOL (ML) MUCOUS MEMBRANE
Status: DISCONTINUED | OUTPATIENT
Start: 2024-11-23 | End: 2024-11-30 | Stop reason: HOSPADM

## 2024-11-23 RX ORDER — ONDANSETRON 2 MG/ML
4 INJECTION INTRAMUSCULAR; INTRAVENOUS EVERY 6 HOURS PRN
DISCHARGE
Start: 2024-11-23

## 2024-11-23 RX ORDER — MINERAL OIL/HYDROPHIL PETROLAT
OINTMENT (GRAM) TOPICAL
Status: DISCONTINUED | OUTPATIENT
Start: 2024-11-23 | End: 2024-11-30 | Stop reason: HOSPADM

## 2024-11-23 RX ORDER — LIDOCAINE 4 G/G
1 PATCH TOPICAL EVERY 24 HOURS
DISCHARGE
Start: 2024-11-24

## 2024-11-23 RX ORDER — HYDROMORPHONE HYDROCHLORIDE 1 MG/ML
2 SOLUTION ORAL
Status: DISCONTINUED | OUTPATIENT
Start: 2024-11-23 | End: 2024-11-25

## 2024-11-23 RX ORDER — LORAZEPAM 2 MG/ML
1 INJECTION INTRAMUSCULAR
Status: DISCONTINUED | OUTPATIENT
Start: 2024-11-23 | End: 2024-11-30 | Stop reason: HOSPADM

## 2024-11-23 RX ORDER — CARBOXYMETHYLCELLULOSE SODIUM 5 MG/ML
1-2 SOLUTION/ DROPS OPHTHALMIC
Status: DISCONTINUED | OUTPATIENT
Start: 2024-11-23 | End: 2024-11-26

## 2024-11-23 RX ORDER — MINERAL OIL/HYDROPHIL PETROLAT
OINTMENT (GRAM) TOPICAL
DISCHARGE
Start: 2024-11-23

## 2024-11-23 RX ORDER — DEXTROSE, SODIUM CHLORIDE, SODIUM LACTATE, POTASSIUM CHLORIDE, AND CALCIUM CHLORIDE 5; .6; .31; .03; .02 G/100ML; G/100ML; G/100ML; G/100ML; G/100ML
100 INJECTION, SOLUTION INTRAVENOUS CONTINUOUS
DISCHARGE
Start: 2024-11-23

## 2024-11-23 RX ORDER — BISACODYL 10 MG
10 SUPPOSITORY, RECTAL RECTAL
DISCHARGE
Start: 2024-11-25

## 2024-11-23 RX ORDER — ATROPINE SULFATE 10 MG/ML
2 SOLUTION/ DROPS OPHTHALMIC EVERY 4 HOURS PRN
Status: DISCONTINUED | OUTPATIENT
Start: 2024-11-23 | End: 2024-11-30 | Stop reason: HOSPADM

## 2024-11-23 RX ORDER — ACETAMINOPHEN 650 MG/1
650 SUPPOSITORY RECTAL EVERY 6 HOURS PRN
Status: DISCONTINUED | OUTPATIENT
Start: 2024-11-23 | End: 2024-11-30 | Stop reason: HOSPADM

## 2024-11-23 RX ORDER — SENNOSIDES 8.6 MG
1 TABLET ORAL 2 TIMES DAILY PRN
Status: DISCONTINUED | OUTPATIENT
Start: 2024-11-23 | End: 2024-11-26

## 2024-11-23 RX ORDER — NALOXONE HYDROCHLORIDE 0.4 MG/ML
0.1 INJECTION, SOLUTION INTRAMUSCULAR; INTRAVENOUS; SUBCUTANEOUS
Status: DISCONTINUED | OUTPATIENT
Start: 2024-11-23 | End: 2024-11-26

## 2024-11-23 RX ORDER — ACETAMINOPHEN 325 MG/1
650 TABLET ORAL EVERY 6 HOURS PRN
Status: DISCONTINUED | OUTPATIENT
Start: 2024-11-23 | End: 2024-11-30 | Stop reason: HOSPADM

## 2024-11-23 RX ORDER — ACETAMINOPHEN 650 MG/20.3ML
650 LIQUID ORAL EVERY 6 HOURS PRN
Status: DISCONTINUED | OUTPATIENT
Start: 2024-11-23 | End: 2024-11-26

## 2024-11-23 RX ORDER — ONDANSETRON 2 MG/ML
4 INJECTION INTRAMUSCULAR; INTRAVENOUS EVERY 6 HOURS PRN
Status: DISCONTINUED | OUTPATIENT
Start: 2024-11-23 | End: 2024-11-30 | Stop reason: HOSPADM

## 2024-11-23 RX ORDER — ONDANSETRON 4 MG/1
4 TABLET, ORALLY DISINTEGRATING ORAL EVERY 6 HOURS PRN
Status: DISCONTINUED | OUTPATIENT
Start: 2024-11-23 | End: 2024-11-30 | Stop reason: HOSPADM

## 2024-11-23 RX ORDER — HYDROMORPHONE HYDROCHLORIDE 1 MG/ML
1 SOLUTION ORAL
Status: DISCONTINUED | OUTPATIENT
Start: 2024-11-23 | End: 2024-11-25

## 2024-11-23 RX ORDER — ACETAMINOPHEN 650 MG/20.3ML
650 LIQUID ORAL EVERY 6 HOURS PRN
DISCHARGE
Start: 2024-11-23

## 2024-11-23 RX ORDER — LORAZEPAM 2 MG/ML
1 INJECTION INTRAMUSCULAR
Status: SHIPPED | DISCHARGE
Start: 2024-11-23

## 2024-11-23 RX ORDER — HALOPERIDOL 5 MG/ML
1 INJECTION INTRAMUSCULAR
DISCHARGE
Start: 2024-11-23

## 2024-11-23 RX ORDER — LIDOCAINE 4 G/G
1 PATCH TOPICAL EVERY 24 HOURS
Status: DISCONTINUED | OUTPATIENT
Start: 2024-11-24 | End: 2024-11-30 | Stop reason: HOSPADM

## 2024-11-23 RX ORDER — DEXTROSE, SODIUM CHLORIDE, SODIUM LACTATE, POTASSIUM CHLORIDE, AND CALCIUM CHLORIDE 5; .6; .31; .03; .02 G/100ML; G/100ML; G/100ML; G/100ML; G/100ML
100 INJECTION, SOLUTION INTRAVENOUS CONTINUOUS
Status: DISCONTINUED | OUTPATIENT
Start: 2024-11-23 | End: 2024-11-25

## 2024-11-23 RX ORDER — PROCHLORPERAZINE MALEATE 5 MG/1
5 TABLET ORAL EVERY 6 HOURS PRN
Status: DISCONTINUED | OUTPATIENT
Start: 2024-11-23 | End: 2024-11-30 | Stop reason: HOSPADM

## 2024-11-23 RX ORDER — HYDROMORPHONE HYDROCHLORIDE 2 MG/1
2 TABLET ORAL
Status: DISCONTINUED | OUTPATIENT
Start: 2024-11-23 | End: 2024-11-25

## 2024-11-23 RX ORDER — LORAZEPAM 1 MG/1
1 TABLET ORAL
Status: DISCONTINUED | OUTPATIENT
Start: 2024-11-23 | End: 2024-11-30 | Stop reason: HOSPADM

## 2024-11-23 RX ORDER — ATROPINE SULFATE 10 MG/ML
2 SOLUTION/ DROPS OPHTHALMIC EVERY 4 HOURS PRN
DISCHARGE
Start: 2024-11-23

## 2024-11-23 RX ORDER — ACETAMINOPHEN 650 MG/1
650 SUPPOSITORY RECTAL EVERY 6 HOURS PRN
DISCHARGE
Start: 2024-11-23

## 2024-11-23 RX ADMIN — SODIUM CHLORIDE, SODIUM LACTATE, POTASSIUM CHLORIDE, CALCIUM CHLORIDE AND DEXTROSE MONOHYDRATE: 5; 600; 310; 30; 20 INJECTION, SOLUTION INTRAVENOUS at 13:14

## 2024-11-23 RX ADMIN — HYDROMORPHONE HYDROCHLORIDE 1 MG: 5 SOLUTION ORAL at 20:13

## 2024-11-23 RX ADMIN — ACETAMINOPHEN 650 MG: 650 SUPPOSITORY RECTAL at 08:34

## 2024-11-23 RX ADMIN — HYDROMORPHONE HYDROCHLORIDE 0.5 MG: 1 INJECTION, SOLUTION INTRAMUSCULAR; INTRAVENOUS; SUBCUTANEOUS at 16:01

## 2024-11-23 RX ADMIN — LIDOCAINE 1 PATCH: 4 PATCH TOPICAL at 10:24

## 2024-11-23 RX ADMIN — HYDROMORPHONE HYDROCHLORIDE 1 MG: 5 SOLUTION ORAL at 22:16

## 2024-11-23 RX ADMIN — SODIUM CHLORIDE, SODIUM LACTATE, POTASSIUM CHLORIDE, CALCIUM CHLORIDE AND DEXTROSE MONOHYDRATE 100 ML/HR: 5; 600; 310; 30; 20 INJECTION, SOLUTION INTRAVENOUS at 23:24

## 2024-11-23 RX ADMIN — SODIUM CHLORIDE, SODIUM LACTATE, POTASSIUM CHLORIDE, CALCIUM CHLORIDE AND DEXTROSE MONOHYDRATE: 5; 600; 310; 30; 20 INJECTION, SOLUTION INTRAVENOUS at 03:11

## 2024-11-23 NOTE — PROGRESS NOTES
Skin affirmation note    Admitting nurse completed full skin assessment, Santiago score and Santiago interventions. This writer agrees with the initial skin assessment findings.    Tesha Gordillo RN on 11/23/2024 at 3:21 AM

## 2024-11-23 NOTE — PLAN OF CARE
Goal Outcome Evaluation:      Plan of Care Reviewed With: patient          Outcome Evaluation: Pt O2 at 84% on RA - Pt placed on 1L via Oxymask. T 102.5, PRN rectal MPAP ordered from Pharmacy but Pt refused it. Primary RN Luz oviedo.    Cold washcloth applied to forehead and ice packs applied to pits.    Octaviano Hernández RN on 11/23/2024 at 8:24 AM      O2 94% on 1L oxymask.  Octaviano Hernández RN on 11/23/2024 at 8:24 AM     [id number]

## 2024-11-23 NOTE — DISCHARGE SUMMARY
"[facility]  Hospitalist Discharge Summary      Date of Admission:  11/22/2024  Date of Discharge:  11/23/2024  Discharging Provider: Mike Oconnor MD  Discharge Service: Hospitalist Service    Discharge Diagnoses   # Severe sepsis   # Gram positive bacteremia   # Severe hypoglycemia   # Metastatic colorectal cancer, progressing   # Hyponatremia  # Recent MIRANDA (during previous admission but Cr remains elevated)  # Elevated AST  # Elevated alk phos   # Elevated lactate   # Acute on chronic anemia   # Thrombocytosis  # Troponin elevation  # Hx Proximal DVT of RLE   # Type 2 DM  # Depression   # Emphysema   # Hyperlipidemia     Clinically Significant Risk Factors     # Overweight: Estimated body mass index is 26.44 kg/m  as calculated from the following:    Height as of this encounter: 1.6 m (5' 3\").    Weight as of this encounter: 67.7 kg (149 lb 4 oz).       Follow-ups Needed After Discharge   Follow-up Appointments       Follow Up and recommended labs and tests      Plan for readmission with hospice.                Unresulted Labs Ordered in the Past 30 Days of this Admission       Date and Time Order Name Status Description    11/22/2024  3:36 PM Urine Culture Preliminary     11/22/2024  2:37 PM Blood Culture Line, venous Preliminary         These results will be followed up by Mike Oconnor MD.    Discharge Disposition   Admited to hospice care.   Agency: [facility] hospice.  Discharged to home  Condition at discharge: Stable    Hospital Course   Michelle Holguin is a 64 year old female with past medical history significant for type 2 DM, hyperlipidemia, emphysema, metastatic rectal cancer with recent admission 10/24- 11/18 who presents from TCU on 11/22/2024 with altered mental status and hypoglycemia. [facility] sepsis and bacteremia. Transitioned to comfort focused care plan. Discharging with plan to re-admit with hospice here at [facility].     # Comfort focused care plan   # Severe " sepsis   # Gram positive bacteremia   # Severe hypoglycemia   Presents with altered mental status and hypoglycemia in the setting of metastatic colorectal cancer, see below. ED workup revealed labile blood glucose (28 - 110), hyponatremia (130), acute kidney injury (GFR 43), lactic acidosis (2.2), leukocytosis (21.4), Hgb 6.6, positive UA, and CT evidence of progression of known metastasis. ED provider had lengthy discussion of goals of care with patient's family and discussed patient with her Surgeon and oncologist at [facility]. Patient had been started on antibiotics and IV glucose in fluids in an attempt to temporize things to be able to get her to home. On further discussion antibiotics were stopped but D5 LR continued. Plan for comfort focused care.  - Comfort cares protocol and analgesia ordered   - Continued D5LR @ 100mL/hr   - Care management consult and hospice consulted accepted for GIP.     # Metastatic colorectal cancer, progressing   Developed pelvic pain in July 2024; pelvic US revealed a large centrally necrotic appearing lesion approximately 11 x 5 x 10 cm in size. It was inseparable from the cecum raising the possibility of a colon/appendiceal primary. CEA was obtained and found to be elevated at 723. CA-125 was normal at 12. Repeat imaging with CT C/A/P was performed on 8/24/24 which did not show any evidence of metastatic disease. Pelvic MRI on 9/3/24 showed a large irregular thick-walled mass lesion in the pelvis, again concerning for cecal/appendiceal origin with multiple likely metastatic necrotic lymph nodes and evidence of peritoneal carcinomatosis. Admitted to [facility] 10/24/24 through 11/18/24 from [facility] for further evaluation of a large pelvic mass with high suspicion of locoregional speard. Found to have a large cecal/ascending colon mass with fistulous communication to the uterus. RLQ fluid collection s/p IR drain 10/24/24 and removal 11/7/24. IV  antibiotics initiated, but fluid cultures with no growth. Liver metastases noted on CT. Liver biopsy 10/25/24 revealed metastatic colorectal carcinoma. A diverting stoma was recommended by Colorectal Surgery due to high risk of future bowel obstruction. Found to have a subsegmental pulmonary embolism 10/25/24 with right common femoral vein and great saphenous vein DVT. A bilateral ureteral stent was placed 10/26/24 for left hydroureteronephrosis. She was discharged to TCU on 11/19/24. ED provider discussed this patients case with Oncologist Dr. Garland at [place] who confirmed primary tumor is unresectable.   - see care plan above.     # Hyponatremia  # Recent MIRANDA (during previous admission but Cr remains elevated)  # Elevated AST  # Elevated alk phos   # Elevated lactate   All likely related to progression of malignancy and now worsened in the setting of sepsis. No further checks with comfort focused cares.     # Acute on chronic anemia   Likely related to malignancy. Certainly at risk for intraabdominal bleed to no evidence f active bleed.     # Thrombocytosis  Likely reactive with sepsis.     # Troponin elevation  Adynamic suspect related to sepsis     # Hx Proximal DVT of RLE   Identified during recent hospitalization. Managed prior to admission with eliquis 5mg BID. Stopped with comfort cares transition and questionable ability to take po.     # Type 2 DM  Presents with hypoglycemia as above. Labile BG in the ED. Most recent HgbA1c 7.1% on 11/05/24. Managed prior to admission with glimepiride 3mg daily. Holding medications with hypoglycemia and transition to comfort focused plan.     # Depression   Managed prior to admission with duloxetine 30mg nightly. Stopped with inability to take PO reliably.     # Emphysema   Noted on CT chest 04/2024. Managed prior to admission with albuterol inhaler prn. No wheezing noted and with comfort focused plan not ordered.    # Hyperlipidemia   Managed prior to admission with  atorvastatin 40mg daily. Discontinued with comfort cares.     Consultations This Hospital Stay   SPIRITUAL HEALTH SERVICES IP CONSULT  SPIRITUAL HEALTH SERVICES IP CONSULT  CARE MANAGEMENT / SOCIAL WORK IP CONSULT  [facility] INPATIENT HOSPICE ADULT CONSULT    Code Status   No CPR- Do NOT Intubate    Time Spent on this Encounter   I, Mike Oconnor MD, personally saw the patient today and spent greater than 30 minutes discharging this patient.       Mike Oconnor MD  [facility] MEDICAL SURGICAL  [address] [address]  Phone: [phone]  Fax: [phone]  ______________________________________________________________________    Physical Exam   Vital Signs: Temp: (!) 101  F (38.3  C) Temp src: Axillary BP: 107/45 Pulse: (!) 126 (Notified nurse)   Resp: (!) 32 SpO2: 94 % O2 Device: Oxymask Oxygen Delivery: 1 LPM  Weight: 149 lbs 4.02 oz  General Appearance:  Laying in bed, appears to be resting comfortably   Respiratory: Breathing easily on oxy mask   Cardiovascular: Tachycardic with a regular rate, extremities warm and well perfused   GI: Abdomen soft, ostomies in place in abdominal incision midline that appears to be healing appropriately   Skin: No rashes or lesions   Other:  Arouses to touch but quickly falls back asleep        Primary Care Physician   [facility] [place]    Discharge Orders      Follow Up and recommended labs and tests    Plan for readmission with hospice.     Reason for your hospital stay    You were hospitalized with sepsis in the setting of progression of your malignancy and are being discharged to continue hospice care in the hospital.     Activity - Up ad nikos    Patient to remain in bed with hospice     Diet    Follow this diet upon discharge: Current Diet:Orders Placed This Encounter      Combination Diet Regular Diet Adult       Significant Results and Procedures   Results for orders placed or performed during the hospital encounter  of 11/22/24   XR Chest Port 1 View    Narrative    XR CHEST PORT 1 VIEW 11/22/2024 4:22 PM    HISTORY: Fever    COMPARISON: None.      Impression    IMPRESSION: Mild linear opacities in the right lung base, either  representing atelectasis or pneumonia. Trace right pleural effusion.  No pneumothorax. Normal heart size.    BRIAN DELANEY MD         SYSTEM ID:  [id number]   CT Abdomen Pelvis w Contrast    Narrative    CT ABDOMEN PELVIS W CONTRAST 11/22/2024 3:53 PM    CLINICAL HISTORY: 64F, known metastatic colorectal cancer recently s/p  bilateral colostomy placement, here with altered mental status, fever,  generalized abdominal tenderness    TECHNIQUE: CT scan of the abdomen and pelvis was performed following  injection of IV contrast. Multiplanar reformats were obtained. Dose  reduction techniques were used.  CONTRAST: 70 ml isovue 370    COMPARISON: CT abdomen and pelvis performed on 10/10/2024    FINDINGS:   LOWER CHEST: Small bilateral pleural effusions are present with  subadjacent subsegmental atelectasis.    HEPATOBILIARY: Multiple new and enlarging hypoattenuating lesions are  present in the liver including a new 1.7 cm lesion in the right  hepatic lobe (series 2, image 8). Another example includes a 1.3 cm  lesion along the lateral right hepatic lobe that previously measured  0.6 cm (series 2, image 21). Liver is mildly enlarged measuring up to  18 cm in craniocaudal length. Gallbladder appears unremarkable.    PANCREAS: No significant mass, duct dilatation, or inflammatory  change.    SPLEEN: Normal size.    ADRENAL GLANDS: No significant nodules.    KIDNEYS/BLADDER: Severe bilateral hydroureteronephrosis is present.  Bilateral ureteral stents are seen with proximal tips in the renal  pelvis and distal tips in the urinary bladder. Distal portions of both  ureters appear to be encased by the cecal/ascending colonic mass in  the pelvis. Stable exophytic hyperdense lesion along the lower pole of  the left  kidney measuring up to 1.6 cm. Minimally complex 1.5 cm  cystic lesion is noted along the upper pole of the left kidney with  thin internal septation. See below for description of bladder  infiltration by the colonic mass described below. 0.8 cm enhancing  soft tissue nodule is noted along the bladder dome and was not seen  previously. 14 mm calculus appears unchanged within the lumen of the  urinary bladder.    BOWEL: Perforated cecal/ascending colonic mass and appears increased  in size since the prior exam, measuring up to 15.6 x 13.4 cm compared  with the prior measurement of approximately 12.6 x 6.6 cm (series 2,  image 64). There is adjacent invasion into the fistulous communication  with the uterus which demonstrates internal gas. Additional segmental  wall thickening is noted along the rectosigmoid colon and ileum which  appear to be encased by the mass. The mass also infiltrates along the  posterior and lateral walls of the urinary bladder.  Mild to moderate  surrounding fat stranding is noted along the anterior border of the  mass. Multiple diverticula are seen along the sigmoid colon with  adjacent fat stranding and associated wall thickening suggestive of  diverticulitis. Similar findings were also seen on the prior exam.    PELVIC ORGANS: Peripherally enhancing fluid collection along the right  pelvis appears decreased in size and measures approximately 4.4 x 5.3  cm, previously measuring up to 15.4 x 13.2 cm (series 2, image 62).  Loculated fluid is noted posteriorly in the pelvis anterior to the  rectum which also appears decreased in size, measuring 8.9 x 6.2 cm  compared with the prior measurement of 8.8 x 9.9 cm. Masslike soft  tissue thickening along the bilateral adnexal regions appears more  prominent measuring up to 5.6 x 3.4 cm compared with the prior  measurement of 3.5 x 3.4 cm on the right side (series 2, image 66) and  3.4 x 2.6 cm compared with the prior measurement of 2.4 x 2.7 cm  on  the left side (series 2, image 63).    ADDITIONAL FINDINGS: Enlarging lymphadenopathy is present throughout  the abdomen and pelvis. For example, one of the largest lymph nodes  measures up to 2.2 cm along the left para-aortic space, previously  measuring up to 0.9 cm (series 2, image 39). Another example includes  a 2.0 cm lymph node along the left external iliac chain that  previously measured 1.1 cm (series 2, image 73). Mild intra-abdominal  ascites is seen. Ostomies are noted bilaterally along the left and  right abdominal walls. Nodular peritoneal thickening also appears more  prominent since the prior study with peritoneal thickness measuring up  to 9 mm along the right anterior pelvis (series 2, image 60).    MUSCULOSKELETAL: Multilevel degenerative changes are present in the  spine. Surgical hardware is again seen within the right femur. Stable  punctate sclerotic focus at the L5 vertebral level, suspected to  reflect a bone island. Subcutaneous edema noted along the bilateral  flank regions within the lateral abdominal walls.      Impression    IMPRESSION:   1.  Enlarging perforated, cecal/ascending colonic mass with worsening  invasion and fistulous communication with the uterus. There is also  more prominent indentation along the urinary bladder and bilateral  adnexal regions. Encasement of the bilateral distal ureters,  rectosigmoid colon and terminal ileum are also present which are most  likely infiltrated by the mass.  2.  Decreased size of complex fluid collections in the pelvis. These  collections have peripheral wall enhancement and could reflect  abscesses versus contained perforations.  3.  Severe bilateral hydroureteronephrosis despite presence of  bilateral ureteral stents. Findings are suspected to be related to  encasement by the bilateral ureters by the colonic mass described  above.  4.  New and enlarging hypoattenuating masses in the liver compatible  with worsening metastatic  disease.  5.  Enlarging lymphadenopathy in the abdomen and pelvis compatible  with worsening metastatic disease. Nodular peritoneal thickening also  appears more conspicuous, especially within the pelvis suggestive of  peritoneal carcinomatosis.  6.  0.8 cm enhancing soft tissue nodule is noted along the bladder  dome and was not seen previously. Findings could reflect a metastatic  implant versus primary bladder malignancy.  7.  Acute diverticulitis of the sigmoid colon, also seen on the prior  exam.  8.  Subcutaneous edema seen along the bilateral flank regions within  the lateral abdominal walls.  9.  Small bilateral pleural effusions.    BECKY VERONICA MD         SYSTEM ID:  [id number]   Head CT w/o contrast    Narrative    CT SCAN OF THE HEAD WITHOUT CONTRAST   11/22/2024 3:50 PM     HISTORY: 64F, known metastatic colorectal carcinoma, here with altered  mental status    TECHNIQUE:  Axial images of the head and coronal reformations without  IV contrast material. Radiation dose for this scan was reduced using  automated exposure control, adjustment of the mA and/or kV according  to patient size, or iterative reconstruction technique.    COMPARISON: None.    FINDINGS: There is no evidence of intracranial hemorrhage, mass, acute  infarct or anomaly. The ventricles are normal in size, shape and  configuration. Mild patchy periventricular white matter hypodensities  which are nonspecific, but likely related to chronic microvascular  ischemic disease. Partially empty sella, within normal limits for age.  Bilateral basal ganglia mineralization.    The visualized portions of the sinuses and mastoids appear normal. The  bony calvarium and bones of the skull base appear intact.       Impression    IMPRESSION:   No CT evidence of an intracranial mass. If there is  further clinical concern, contrast enhanced MRI is recommended to  exclude small metastases given increased sensitivity of MRI compared  to CT.      DANIEL  MD RADHA         SYSTEM ID:  [id number]       Discharge Medications   Discharge Medication List as of 11/23/2024  4:23 PM        START taking these medications    Details   acetaminophen (TYLENOL) 650 MG suppository Place 1 suppository (650 mg) rectally every 6 hours as needed for mild pain or fever (temperature over 100  F )., Transitional      acetaminophen (TYLENOL) 650 MG/20.3ML liquid Take 20.3 mLs (650 mg) by mouth every 6 hours as needed for mild pain or fever (temperature over 100  F)., Transitional      atropine 1 % ophthalmic solution Place 2 drops under the tongue every 4 hours as needed for secretions., Transitional      bisacodyl (DULCOLAX) 10 MG suppository Place 1 suppository (10 mg) rectally once as needed for other (for no bowel movement for 72 hours)., Transitional      carboxymethylcellulose PF (REFRESH PLUS) 0.5 % ophthalmic solution Place 1-2 drops into both eyes every hour as needed for dry eyes., Transitional      dextrose 5% in lactated ringers infusion Inject 100 mL/hr at 100 mL/hr into the vein continuously., Transitional      haloperidol lactate (HALDOL) 5 MG/ML injection Inject 0.2 mLs (1 mg) over 1 minutes into the vein every hour as needed for agitation., Transitional      LORazepam (ATIVAN) 1 MG tablet Place 1 tablet (1 mg) under the tongue every 3 hours as needed for anxiety., Transitional      LORazepam (ATIVAN) 2 MG/ML injection Inject 0.5 mLs (1 mg) into the vein every 3 hours as needed for anxiety., Transitional      mineral oil-hydrophilic petrolatum (AQUAPHOR) external ointment Apply topically every hour as needed for dry skin.Transitional      ondansetron (ZOFRAN) 2 MG/ML injection Inject 2 mLs (4 mg) over 2-5 minutes into the vein every 6 hours as needed for nausea or vomiting.Transitional      prochlorperazine (COMPAZINE) 5 MG/ML injection Inject 1 mL (5 mg) over 1-2 minutes into the vein every 6 hours as needed for nausea or agitation.Transitional           CONTINUE  these medications which have CHANGED    Details   Lidocaine (LIDOCARE) 4 % Patch Place 1 patch over 12 hours onto the skin every 24 hours. To prevent lidocaine toxicity, patient should be patch free for 12 hrs daily.Transitional      nicotine (NICODERM CQ) 7 MG/24HR 24 hr patch Place 1 patch over 24 hours onto the skin daily as needed for nicotine withdrawal symptoms.Transitional           STOP taking these medications       acetaminophen (TYLENOL) 500 MG tablet Comments:   Reason for Stopping:         albuterol (PROAIR HFA/PROVENTIL HFA/VENTOLIN HFA) 108 (90 Base) MCG/ACT inhaler Comments:   Reason for Stopping:         apixaban ANTICOAGULANT (ELIQUIS) 5 MG tablet Comments:   Reason for Stopping:         atorvastatin (LIPITOR) 40 MG tablet Comments:   Reason for Stopping:         DULoxetine (CYMBALTA) 30 MG capsule Comments:   Reason for Stopping:         glimepiride (AMARYL) 4 MG tablet Comments:   Reason for Stopping:         lidocaine (LIDODERM) 5 % patch Comments:   Reason for Stopping:         ondansetron (ZOFRAN) 4 MG tablet Comments:   Reason for Stopping:         oxyCODONE (ROXICODONE) 5 MG tablet Comments:   Reason for Stopping:         simethicone (MYLICON) 80 MG chewable tablet Comments:   Reason for Stopping:             Allergies   Allergies   Allergen Reactions    Codeine GI Disturbance, Nausea and Nausea and Vomiting     Other Reaction(s): GI intolerance    Other reaction(s): GI Disturbance, GI Upset    Nickel Rash     Other Reaction(s): Not available, Other (see comments)

## 2024-11-23 NOTE — PLAN OF CARE
Problem: Palliative Care  Goal: Enhanced Quality of Life  Outcome: Adequate for Care Transition   Goal Outcome Evaluation:    Patient calm. Family present. Bedrest, and comfort cares. Pain managed with Dilauded and Tylenol. Turn q 2 hours or as patient prefers.     Plan: Transition to [facility]

## 2024-11-23 NOTE — PLAN OF CARE
Problem: Adult Inpatient Plan of Care  Goal: Plan of Care Review  Description: The Plan of Care Review/Shift note should be completed every shift.  The Outcome Evaluation is a brief statement about your assessment that the patient is improving, declining, or no change.  This information will be displayed automatically on your shift  note.  Flowsheets (Taken 11/23/2024 0898)  Outcome Evaluation: Patient is A/O x2 and disoriented to place. Patient is very sleepy and arouses easily. Patient has a colostomy and an ileostomy. Surgical incision midline abdomen is scabbing and healing. Dermabond is over incision and is ROSSY. IV D5LR infusing at 100mls/hr to keep sugars stable. Blood sugars when to the floor was 130 and at 0200 was 151. Purewick is in place. Pt has metastatic colon cancer and the plan is to discharge to home with hospice.  Plan of Care Reviewed With: patient  Overall Patient Progress: no change   Goal Outcome Evaluation:      Plan of Care Reviewed With: patient    Overall Patient Progress: no changeOverall Patient Progress: no change    Outcome Evaluation: Patient is A/O x2 and disoriented to place. Patient is very sleepy and arouses easily. Patient has a colostomy and an ileostomy. Surgical incision midline abdomen is scabbing and healing. Dermabond is over incision and is ROSSY. IV D5LR infusing at 100mls/hr to keep sugars stable. Blood sugars when to the floor was 130 and at 0200 was 151. Purewick is in place. Pt has metastatic colon cancer and the plan is to discharge to home with hospice.

## 2024-11-23 NOTE — ED NOTES
In to spot check blood glucose, , pt will open eyes, moaning. Pt denies pain, states she is cold. Order placed for amp of d50. Notified Dr. Kumar

## 2024-11-23 NOTE — ED NOTES
Patient temperature during rounds 102.8 (MD notified) Tylenol suppository ordered.  Patient refusing to take Tylenol both oral and rectally.  MD updated.  Toradol ordered and given IV (pt okay with this)  Patient repositioned by this RN and tech and wet cold rag placed on forehead.  Patient given Dilaudid for pain.  Appears to be resting comfortably at this time.

## 2024-11-23 NOTE — H&P
[facility] [place]    History and Physical - Hospitalist Service       Date of Admission:  11/23/2024    Assessment & Plan      Michelle Holguin is a 64 year old female with past medical history significant for type 2 DM, hyperlipidemia, emphysema, metastatic rectal cancer with recent admission 10/24- 11/18 who presents from TCU on 11/22/2024 with altered mental status and hypoglycemia. FTH sepsis and bacteremia. Transitioned to comfort focused care plan. Discharged and now readmitted with hospice.      # Comfort focused care plan   # Severe sepsis   # Gram positive bacteremia   # Severe hypoglycemia   Presents with altered mental status and hypoglycemia in the setting of metastatic colorectal cancer, see below. ED workup revealed labile blood glucose (28 - 110), hyponatremia (130), acute kidney injury (GFR 43), lactic acidosis (2.2), leukocytosis (21.4), Hgb 6.6, positive UA, and CT evidence of progression of known metastasis. ED provider had lengthy discussion of goals of care with patient's family and discussed patient with her Surgeon and oncologist at [facility]. Patient had been started on antibiotics and IV glucose in fluids in an attempt to temporize things to be able to get her to home. On further discussion antibiotics were stopped but D5 LR continued. Discharged and re-admitted with hospice.   - Comfort cares protocol and analgesia ordered   - Continued D5LR @ 100mL/hr   - Care management consult and hospice consulted      # Metastatic colorectal cancer, progressing   Developed pelvic pain in July 2024; pelvic US revealed a large centrally necrotic appearing lesion approximately 11 x 5 x 10 cm in size. It was inseparable from the cecum raising the possibility of a colon/appendiceal primary. CEA was obtained and found to be elevated at 723. CA-125 was normal at 12. Repeat imaging with CT C/A/P was performed on 8/24/24 which did not show any evidence of metastatic disease. Pelvic MRI on  9/3/24 showed a large irregular thick-walled mass lesion in the pelvis, again concerning for cecal/appendiceal origin with multiple likely metastatic necrotic lymph nodes and evidence of peritoneal carcinomatosis. Admitted to [facility] 10/24/24 through 11/18/24 from the [facility] for further evaluation of a large pelvic mass with high suspicion of locoregional speard. Found to have a large cecal/ascending colon mass with fistulous communication to the uterus. RLQ fluid collection s/p IR drain 10/24/24 and removal 11/7/24. IV antibiotics initiated, but fluid cultures with no growth. Liver metastases noted on CT. Liver biopsy 10/25/24 revealed metastatic colorectal carcinoma. A diverting stoma was recommended by Colorectal Surgery due to high risk of future bowel obstruction. Found to have a subsegmental pulmonary embolism 10/25/24 with right common femoral vein and great saphenous vein DVT. A bilateral ureteral stent was placed 10/26/24 for left hydroureteronephrosis. She was discharged to TCU on 11/19/24. ED provider discussed this patients case with Oncologist Dr. Garland at [facility] who confirmed primary tumor is unresectable.   - see care plan above.      # Hyponatremia  # Recent MIRANDA (during previous admission but Cr remains elevated)  # Elevated AST  # Elevated alk phos   # Elevated lactate   All likely related to progression of malignancy and now worsened in the setting of sepsis. No further checks with comfort focused cares.      # Acute on chronic anemia   Likely related to malignancy. Certainly at risk for intraabdominal bleed to no evidence f active bleed.      # Thrombocytosis  Likely reactive with sepsis.      # Troponin elevation  Adynamic suspect related to sepsis      # Hx Proximal DVT of RLE   Identified during recent hospitalization. Managed prior to admission with eliquis 5mg BID. Stopped with comfort cares transition and questionable ability to take po.      # Type 2  "DM  Presents with hypoglycemia as above. Labile BG in the ED. Most recent HgbA1c 7.1% on 11/05/24. Managed prior to admission with glimepiride 3mg daily. Holding medications with hypoglycemia and transition to comfort focused plan.      # Depression   Managed prior to admission with duloxetine 30mg nightly. Stopped with inability to take PO reliably.      # Emphysema   Noted on CT chest 04/2024. Managed prior to admission with albuterol inhaler prn. No wheezing noted and with comfort focused plan not ordered.     # Hyperlipidemia   Managed prior to admission with atorvastatin 40mg daily. Discontinued with comfort cares.         Diet: Regular Diet Adult    DVT Prophylaxis: Contraindicated with comfort cares   Vazquez Catheter: Not present  Lines: None     Cardiac Monitoring: None  Code Status: No CPR- Do NOT Intubate      Clinically Significant Risk Factors Present on Admission         # Hyponatremia: Lowest Na = 130 mmol/L in last 2 days, will monitor as appropriate        # Coagulation Defect: INR = 1.62 (Ref range: 0.85 - 1.15) and/or PTT = 41 Seconds (Ref range: 22 - 38 Seconds), will monitor for bleeding    # Hypertension: Noted on problem list      # Anemia: based on hgb <11       # Overweight: Estimated body mass index is 26.44 kg/m  as calculated from the following:    Height as of 11/22/24: 1.6 m (5' 3\").    Weight as of 11/22/24: 67.7 kg (149 lb 4 oz).              Disposition Plan     Medically Ready for Discharge: Anticipated in 2-4 Days           Mike Oconnor MD  Hospitalist Service  [facility]  Securely message with [employer] ([employer]) (more info)  Text page via [facility] Paging/Directory     ______________________________________________________________________    Chief Complaint   Sepsis comfort cares     History is obtained from the patient, family, and chart     History of Present Illness   Michelle Holguin is a 64 year old female that was discharged from the hospital today to be " re-admitted to hospice.       Past Medical History    Past Medical History:   Diagnosis Date    Diabetes (H)     Hyperlipidemia        Past Surgical History   Past Surgical History:   Procedure Laterality Date    IR SOFT TISSUE BIOPSY  9/27/2024       Prior to Admission Medications   Prior to Admission Medications   Prescriptions Last Dose Informant Patient Reported? Taking?   LORazepam (ATIVAN) 1 MG tablet   No No   Sig: Place 1 tablet (1 mg) under the tongue every 3 hours as needed for anxiety.   LORazepam (ATIVAN) 2 MG/ML injection   No No   Sig: Inject 0.5 mLs (1 mg) into the vein every 3 hours as needed for anxiety.   Lidocaine (LIDOCARE) 4 % Patch   No No   Sig: Place 1 patch over 12 hours onto the skin every 24 hours. To prevent lidocaine toxicity, patient should be patch free for 12 hrs daily.   acetaminophen (TYLENOL) 650 MG suppository   No No   Sig: Place 1 suppository (650 mg) rectally every 6 hours as needed for mild pain or fever (temperature over 100  F ).   acetaminophen (TYLENOL) 650 MG/20.3ML liquid   No No   Sig: Take 20.3 mLs (650 mg) by mouth every 6 hours as needed for mild pain or fever (temperature over 100  F).   atropine 1 % ophthalmic solution   No No   Sig: Place 2 drops under the tongue every 4 hours as needed for secretions.   bisacodyl (DULCOLAX) 10 MG suppository   No No   Sig: Place 1 suppository (10 mg) rectally once as needed for other (for no bowel movement for 72 hours).   carboxymethylcellulose PF (REFRESH PLUS) 0.5 % ophthalmic solution   No No   Sig: Place 1-2 drops into both eyes every hour as needed for dry eyes.   dextrose 5% in lactated ringers infusion   No No   Sig: Inject 100 mL/hr at 100 mL/hr into the vein continuously.   haloperidol lactate (HALDOL) 5 MG/ML injection   No No   Sig: Inject 0.2 mLs (1 mg) over 1 minutes into the vein every hour as needed for agitation.   mineral oil-hydrophilic petrolatum (AQUAPHOR) external ointment   No No   Sig: Apply topically  every hour as needed for dry skin.   nicotine (NICODERM CQ) 7 MG/24HR 24 hr patch   No No   Sig: Place 1 patch over 24 hours onto the skin daily as needed for nicotine withdrawal symptoms.   ondansetron (ZOFRAN) 2 MG/ML injection   No No   Sig: Inject 2 mLs (4 mg) over 2-5 minutes into the vein every 6 hours as needed for nausea or vomiting.   prochlorperazine (COMPAZINE) 5 MG/ML injection   No No   Sig: Inject 1 mL (5 mg) over 1-2 minutes into the vein every 6 hours as needed for nausea or agitation.      Facility-Administered Medications: None      ------------------------------------------------------------------------     Physical Exam   Vital Signs:                    Weight: 0 lbs 0 oz    General Appearance:  Laying in bed, appears to be resting comfortably   Respiratory: Breathing easily on oxy mask   Cardiovascular: Tachycardic with a regular rate, extremities warm and well perfused   GI: Abdomen soft, ostomies in place in abdominal incision midline that appears to be healing appropriately   Skin: No rashes or lesions   Other:  Arouses to touch but quickly falls back asleep     Medical Decision Making       45 MINUTES SPENT BY ME on the date of service doing chart review, history, exam, documentation & further activities per the note.      Data     I have personally reviewed the following data over the past 24 hrs:    Trop: 19 (H) BNP: N/A     Procal: N/A CRP: N/A Lactic Acid: 2.2 (H)         Imaging results reviewed over the past 24 hrs:   No results found for this or any previous visit (from the past 24 hours).

## 2024-11-23 NOTE — ED NOTES
[facility]   Admission Handoff    The patient is Michelle Holguin, 64 year old who arrived in the ED by AMBULANCE from home with a complaint of Altered Mental Status (Pt comes from TCU, call placed for low blood glucose. BG have been running low, blood glucose in the 50 pt was given glucagon at TCU. On scene BG was 78, pt give 250 of dextrose, last  per EMS. Pt given 0.5mg narcan X2, pt was a little more responsive after each dose. )  . The patient's current symptoms are a recurrence of a past episode and during this time the symptoms have increased. In the ED, patient was diagnosed with   Final diagnoses:   Sepsis, due to unspecified organism, unspecified whether acute organ dysfunction present (H)   Hypoglycemia         Needed?: No    Allergies:    Allergies   Allergen Reactions    Codeine GI Disturbance, Nausea and Nausea and Vomiting     Other Reaction(s): GI intolerance    Other reaction(s): GI Disturbance, GI Upset    Nickel Rash     Other Reaction(s): Not available, Other (see comments)       Past Medical Hx:   Past Medical History:   Diagnosis Date    Diabetes (H)     Hyperlipidemia        Initial vitals were: BP: 134/52  Pulse: 101  Temp: (!) 102  F (38.9  C)  Resp: 20  Weight: 65.7 kg (144 lb 12.8 oz)  SpO2: 97 %   Recent vital Signs: /52   Pulse 115   Temp (!) 102.3  F (39.1  C) (Oral)   Resp 28   Wt 65.7 kg (144 lb 12.8 oz)   SpO2 90%   BMI 25.65 kg/m      Elimination Status: Continent: No     Activity Level: Total assist/lift    Fall Status: Reason for falls risk:  Mobility, Reason for falls risk: Elimination, and Reason for falls risk: Other- weakness  bed/chair alarm on, activity supervised, and room door open    Baseline Mental status: WDL  Current Mental Status changes: at basesline    Infection present or suspected this encounter: urinary and possible more due to fever and recent surgeries  Sepsis suspected: No    Isolation type:  none    Bariatric equipment needed?: No  In the ED these meds were given:   Medications   sodium chloride (PF) 0.9% PF flush 3 mL (has no administration in time range)   sodium chloride (PF) 0.9% PF flush 3 mL (3 mLs Intracatheter Not Given 11/22/24 2213)   dextrose 5% in lactated ringers infusion ( Intravenous Rate/Dose Verify 11/22/24 2145)   dextrose 50 % injection 50 mL (50 mLs Intravenous $New Bag 11/22/24 1927)   glucose gel 15-30 g (has no administration in time range)     Or   dextrose 50 % injection 25-50 mL (has no administration in time range)     Or   glucagon injection 1 mg (has no administration in time range)   HYDROmorphone (PF) (DILAUDID) injection 0.5 mg (0.5 mg Intravenous $Given 11/22/24 2143)   naloxone (NARCAN) injection 0.1 mg (has no administration in time range)   naloxone (NARCAN) injection 0.2 mg (has no administration in time range)   carboxymethylcellulose PF (REFRESH PLUS) 0.5 % ophthalmic solution 1-2 drop (has no administration in time range)   mineral oil-hydrophilic petrolatum (AQUAPHOR) (has no administration in time range)   morphine (PF) injection 1 mg (has no administration in time range)   ceFEPIme (MAXIPIME) 1 g vial to attach to  mL bag for ADULTS or NS 50 mL bag for PEDS (0 g Intravenous Stopped 11/22/24 1627)   metroNIDAZOLE (FLAGYL) infusion 500 mg (0 mg Intravenous Stopped 11/22/24 1758)   acetaminophen (TYLENOL) Suppository 650 mg (650 mg Rectal $Given 11/22/24 1507)   lactated ringers BOLUS 1,000 mL (0 mLs Intravenous Stopped 11/22/24 1627)   iopamidol (ISOVUE-370) solution 70 mL (70 mLs Intravenous $Given 11/22/24 1551)   sodium chloride 0.9 % bag 500mL for CT scan flush use (58 mLs Intravenous $Given 11/22/24 1552)   lactated ringers BOLUS 1,000 mL (0 mLs Intravenous Stopped 11/22/24 1929)   acetaminophen (TYLENOL) Suppository 650 mg (650 mg Rectal Not Given 11/22/24 2159)   ketorolac (TORADOL) injection 15 mg (15 mg Intravenous $Given 11/22/24 9546)       ips  running?  No    Home pump  No    Current LDAs: Peripheral IV: Site 20g; Gauge Left AC and right AC  D5W/LR     Results:   Labs/Imaging  Ordered and Resulted from Time of ED Arrival Up to the Time of Departure from the ED  Results for orders placed or performed during the hospital encounter of 11/22/24 (from the past 24 hours)   Glucose by meter   Result Value Ref Range    GLUCOSE BY METER POCT 103 (H) 70 - 99 mg/dL   CBC with platelets differential    Narrative    The following orders were created for panel order CBC with platelets differential.  Procedure                               Abnormality         Status                     ---------                               -----------         ------                     CBC with platelets and d...[[id number]]  Abnormal            Final result                 Please view results for these tests on the individual orders.   Comprehensive metabolic panel   Result Value Ref Range    Sodium 130 (L) 135 - 145 mmol/L    Potassium 4.3 3.4 - 5.3 mmol/L    Carbon Dioxide (CO2) 21 (L) 22 - 29 mmol/L    Anion Gap 11 7 - 15 mmol/L    Urea Nitrogen 27.5 (H) 8.0 - 23.0 mg/dL    Creatinine 1.35 (H) 0.51 - 0.95 mg/dL    GFR Estimate 43 (L) >60 mL/min/1.73m2    Calcium 8.8 8.8 - 10.4 mg/dL    Chloride 98 98 - 107 mmol/L    Glucose 132 (H) 70 - 99 mg/dL    Alkaline Phosphatase 247 (H) 40 - 150 U/L    AST 68 (H) 0 - 45 U/L    ALT 33 0 - 50 U/L    Protein Total 6.6 6.4 - 8.3 g/dL    Albumin 2.4 (L) 3.5 - 5.2 g/dL    Bilirubin Total 0.7 <=1.2 mg/dL   Lactic Acid Whole Blood with 1X Repeat in 2 HR when >2   Result Value Ref Range    Lactic Acid, Initial 2.1 (H) 0.7 - 2.0 mmol/L   Blood gas venous   Result Value Ref Range    pH Venous 7.35 7.32 - 7.43    pCO2 Venous 40 40 - 50 mm Hg    pO2 Venous 16 (L) 25 - 47 mm Hg    Bicarbonate Venous 22 21 - 28 mmol/L    Base Excess/Deficit Venous -3.6 (L) -3.0 - 3.0 mmol/L    FIO2 0     Oxyhemoglobin Venous 16 (L) 70 - 75 %    O2 Sat, Venous 15.8 (L) 70.0  - 75.0 %    Narrative    In healthy individuals, oxyhemoglobin (O2Hb) and oxygen saturation (SO2) are approximately equal. In the presence of dyshemoglobins, oxyhemoglobin can be considerably lower than oxygen saturation.   Lipase   Result Value Ref Range    Lipase 46 13 - 60 U/L   CBC with platelets and differential   Result Value Ref Range    WBC Count 21.4 (H) 4.0 - 11.0 10e3/uL    RBC Count 3.02 (L) 3.80 - 5.20 10e6/uL    Hemoglobin 7.0 (L) 11.7 - 15.7 g/dL    Hematocrit 22.2 (L) 35.0 - 47.0 %    MCV 74 (L) 78 - 100 fL    MCH 23.2 (L) 26.5 - 33.0 pg    MCHC 31.5 31.5 - 36.5 g/dL    RDW 20.2 (H) 10.0 - 15.0 %    Platelet Count 530 (H) 150 - 450 10e3/uL    % Neutrophils 93 %    % Lymphocytes 3 %    % Monocytes 3 %    % Eosinophils 0 %    % Basophils 0 %    % Immature Granulocytes 1 %    NRBCs per 100 WBC 0 <1 /100    Absolute Neutrophils 20.0 (H) 1.6 - 8.3 10e3/uL    Absolute Lymphocytes 0.6 (L) 0.8 - 5.3 10e3/uL    Absolute Monocytes 0.7 0.0 - 1.3 10e3/uL    Absolute Eosinophils 0.0 0.0 - 0.7 10e3/uL    Absolute Basophils 0.0 0.0 - 0.2 10e3/uL    Absolute Immature Granulocytes 0.2 <=0.4 10e3/uL    Absolute NRBCs 0.0 10e3/uL   Troponin T, High Sensitivity   Result Value Ref Range    Troponin T, High Sensitivity 16 (H) <=14 ng/L   UA Macroscopic with reflex to Microscopic and Culture    Specimen: Urine, Midstream   Result Value Ref Range    Color Urine Yellow Colorless, Straw, Light Yellow, Yellow    Appearance Urine Cloudy (A) Clear    Glucose Urine Negative Negative mg/dL    Bilirubin Urine Negative Negative    Ketones Urine Negative Negative mg/dL    Specific Gravity Urine 1.009 1.003 - 1.035    Blood Urine Large (A) Negative    pH Urine 5.0 5.0 - 7.0    Protein Albumin Urine 30 (A) Negative mg/dL    Urobilinogen Urine Normal Normal, 2.0 mg/dL    Nitrite Urine Negative Negative    Leukocyte Esterase Urine Large (A) Negative    Bacteria Urine Moderate (A) None Seen /HPF    Mucus Urine Present (A) None Seen /LPF     RBC Urine 60 (H) <=2 /HPF    WBC Urine >182 (H) <=5 /HPF    Squamous Epithelials Urine <1 <=1 /HPF    Narrative    Urine Culture ordered based on laboratory criteria   EKG 12 lead   Result Value Ref Range    Systolic Blood Pressure  mmHg    Diastolic Blood Pressure  mmHg    Ventricular Rate 106 BPM    Atrial Rate 106 BPM    RI Interval 152 ms    QRS Duration 70 ms     ms    QTc 430 ms    P Axis -16 degrees    R AXIS 69 degrees    T Axis 69 degrees    Interpretation ECG       Sinus tachycardia  ST elevation, consider early repolarization, pericarditis, or injury  Nonspecific ST abnormality  Abnormal ECG  No previous ECGs available  Confirmed by SEE ED PROVIDER NOTE FOR, ECG INTERPRETATION ([id number]),  Shayla Esteban ([id number]) on 11/22/2024 3:36:35 PM     Influenza A/B, RSV and SARS-CoV2 PCR (COVID-19) Nose    Specimen: Nose; Swab   Result Value Ref Range    Influenza A PCR Negative Negative    Influenza B PCR Negative Negative    RSV PCR Negative Negative    SARS CoV2 PCR Negative Negative    Narrative    Testing was performed using the Xpert Xpress CoV2/Flu/RSV Assay on the [employer] GeneXpert Instrument. This test should be ordered for the detection of SARS-CoV2, influenza, and RSV viruses in individuals with signs and symptoms of respiratory tract infection. This test is for in vitro diagnostic use under the [place] FDA for laboratories certified under CLIA to perform high or moderate complexity testing. This test has been [place] FDA cleared. A negative result does not rule out the presence of PCR inhibitors in the specimen or target RNA in concentration below the limit of detection for the assay. If only one viral target is positive but coinfection with multiple targets is suspected, the sample should be re-tested with another FDA cleared, approved, or authorized test, if coninfection would change clinical management. This test was validated by the [facility]. These laboratories are certified  under the Clinical Laboratory Improvement Amendments of 1988 (CLIA-88) as qualified to perfom high complexity laboratory testing.   Head CT w/o contrast    Narrative    CT SCAN OF THE HEAD WITHOUT CONTRAST   11/22/2024 3:50 PM     HISTORY: 64F, known metastatic colorectal carcinoma, here with altered  mental status    TECHNIQUE:  Axial images of the head and coronal reformations without  IV contrast material. Radiation dose for this scan was reduced using  automated exposure control, adjustment of the mA and/or kV according  to patient size, or iterative reconstruction technique.    COMPARISON: None.    FINDINGS: There is no evidence of intracranial hemorrhage, mass, acute  infarct or anomaly. The ventricles are normal in size, shape and  configuration. Mild patchy periventricular white matter hypodensities  which are nonspecific, but likely related to chronic microvascular  ischemic disease. Partially empty sella, within normal limits for age.  Bilateral basal ganglia mineralization.    The visualized portions of the sinuses and mastoids appear normal. The  bony calvarium and bones of the skull base appear intact.       Impression    IMPRESSION:   No CT evidence of an intracranial mass. If there is  further clinical concern, contrast enhanced MRI is recommended to  exclude small metastases given increased sensitivity of MRI compared  to CT.      DANIEL GARCIA MD         SYSTEM ID:  [id number]   CT Abdomen Pelvis w Contrast    Narrative    CT ABDOMEN PELVIS W CONTRAST 11/22/2024 3:53 PM    CLINICAL HISTORY: 64F, known metastatic colorectal cancer recently s/p  bilateral colostomy placement, here with altered mental status, fever,  generalized abdominal tenderness    TECHNIQUE: CT scan of the abdomen and pelvis was performed following  injection of IV contrast. Multiplanar reformats were obtained. Dose  reduction techniques were used.  CONTRAST: 70 ml isovue 370    COMPARISON: CT abdomen and pelvis performed on  10/10/2024    FINDINGS:   LOWER CHEST: Small bilateral pleural effusions are present with  subadjacent subsegmental atelectasis.    HEPATOBILIARY: Multiple new and enlarging hypoattenuating lesions are  present in the liver including a new 1.7 cm lesion in the right  hepatic lobe (series 2, image 8). Another example includes a 1.3 cm  lesion along the lateral right hepatic lobe that previously measured  0.6 cm (series 2, image 21). Liver is mildly enlarged measuring up to  18 cm in craniocaudal length. Gallbladder appears unremarkable.    PANCREAS: No significant mass, duct dilatation, or inflammatory  change.    SPLEEN: Normal size.    ADRENAL GLANDS: No significant nodules.    KIDNEYS/BLADDER: Severe bilateral hydroureteronephrosis is present.  Bilateral ureteral stents are seen with proximal tips in the renal  pelvis and distal tips in the urinary bladder. Distal portions of both  ureters appear to be encased by the cecal/ascending colonic mass in  the pelvis. Stable exophytic hyperdense lesion along the lower pole of  the left kidney measuring up to 1.6 cm. Minimally complex 1.5 cm  cystic lesion is noted along the upper pole of the left kidney with  thin internal septation. See below for description of bladder  infiltration by the colonic mass described below. 0.8 cm enhancing  soft tissue nodule is noted along the bladder dome and was not seen  previously. 14 mm calculus appears unchanged within the lumen of the  urinary bladder.    BOWEL: Perforated cecal/ascending colonic mass and appears increased  in size since the prior exam, measuring up to 15.6 x 13.4 cm compared  with the prior measurement of approximately 12.6 x 6.6 cm (series 2,  image 64). There is adjacent invasion into the fistulous communication  with the uterus which demonstrates internal gas. Additional segmental  wall thickening is noted along the rectosigmoid colon and ileum which  appear to be encased by the mass. The mass also infiltrates  along the  posterior and lateral walls of the urinary bladder.  Mild to moderate  surrounding fat stranding is noted along the anterior border of the  mass. Multiple diverticula are seen along the sigmoid colon with  adjacent fat stranding and associated wall thickening suggestive of  diverticulitis. Similar findings were also seen on the prior exam.    PELVIC ORGANS: Peripherally enhancing fluid collection along the right  pelvis appears decreased in size and measures approximately 4.4 x 5.3  cm, previously measuring up to 15.4 x 13.2 cm (series 2, image 62).  Loculated fluid is noted posteriorly in the pelvis anterior to the  rectum which also appears decreased in size, measuring 8.9 x 6.2 cm  compared with the prior measurement of 8.8 x 9.9 cm. Masslike soft  tissue thickening along the bilateral adnexal regions appears more  prominent measuring up to 5.6 x 3.4 cm compared with the prior  measurement of 3.5 x 3.4 cm on the right side (series 2, image 66) and  3.4 x 2.6 cm compared with the prior measurement of 2.4 x 2.7 cm on  the left side (series 2, image 63).    ADDITIONAL FINDINGS: Enlarging lymphadenopathy is present throughout  the abdomen and pelvis. For example, one of the largest lymph nodes  measures up to 2.2 cm along the left para-aortic space, previously  measuring up to 0.9 cm (series 2, image 39). Another example includes  a 2.0 cm lymph node along the left external iliac chain that  previously measured 1.1 cm (series 2, image 73). Mild intra-abdominal  ascites is seen. Ostomies are noted bilaterally along the left and  right abdominal walls. Nodular peritoneal thickening also appears more  prominent since the prior study with peritoneal thickness measuring up  to 9 mm along the right anterior pelvis (series 2, image 60).    MUSCULOSKELETAL: Multilevel degenerative changes are present in the  spine. Surgical hardware is again seen within the right femur. Stable  punctate sclerotic focus at the L5  vertebral level, suspected to  reflect a bone island. Subcutaneous edema noted along the bilateral  flank regions within the lateral abdominal walls.      Impression    IMPRESSION:   1.  Enlarging perforated, cecal/ascending colonic mass with worsening  invasion and fistulous communication with the uterus. There is also  more prominent indentation along the urinary bladder and bilateral  adnexal regions. Encasement of the bilateral distal ureters,  rectosigmoid colon and terminal ileum are also present which are most  likely infiltrated by the mass.  2.  Decreased size of complex fluid collections in the pelvis. These  collections have peripheral wall enhancement and could reflect  abscesses versus contained perforations.  3.  Severe bilateral hydroureteronephrosis despite presence of  bilateral ureteral stents. Findings are suspected to be related to  encasement by the bilateral ureters by the colonic mass described  above.  4.  New and enlarging hypoattenuating masses in the liver compatible  with worsening metastatic disease.  5.  Enlarging lymphadenopathy in the abdomen and pelvis compatible  with worsening metastatic disease. Nodular peritoneal thickening also  appears more conspicuous, especially within the pelvis suggestive of  peritoneal carcinomatosis.  6.  0.8 cm enhancing soft tissue nodule is noted along the bladder  dome and was not seen previously. Findings could reflect a metastatic  implant versus primary bladder malignancy.  7.  Acute diverticulitis of the sigmoid colon, also seen on the prior  exam.  8.  Subcutaneous edema seen along the bilateral flank regions within  the lateral abdominal walls.  9.  Small bilateral pleural effusions.    BECKY VERONICA MD         SYSTEM ID:  [id number]   XR Chest Port 1 View    Narrative    XR CHEST PORT 1 VIEW 11/22/2024 4:22 PM    HISTORY: Fever    COMPARISON: None.      Impression    IMPRESSION: Mild linear opacities in the right lung base, either  representing  atelectasis or pneumonia. Trace right pleural effusion.  No pneumothorax. Normal heart size.    BRIAN DELANEY MD         SYSTEM ID:  [id number]   Lactic acid whole blood   Result Value Ref Range    Lactic Acid 2.2 (H) 0.7 - 2.0 mmol/L   Troponin T, High Sensitivity   Result Value Ref Range    Troponin T, High Sensitivity 19 (H) <=14 ng/L   Glucose by meter   Result Value Ref Range    GLUCOSE BY METER POCT 55 (L) 70 - 99 mg/dL   Glucose by meter   Result Value Ref Range    GLUCOSE BY METER POCT 28 (LL) 70 - 99 mg/dL   Glucose by meter   Result Value Ref Range    GLUCOSE BY METER POCT 101 (H) 70 - 99 mg/dL   Glucose by meter   Result Value Ref Range    GLUCOSE BY METER POCT 110 (H) 70 - 99 mg/dL   Glucose by meter   Result Value Ref Range    GLUCOSE BY METER POCT 101 (H) 70 - 99 mg/dL       For the majority of the shift this patient's behavior was Green     Cardiac Rhythm: Normal Sinus  Pt needs tele? No  Skin/wound Issues: None    Code Status: Full Code    Pain control: good    Nausea control: fair    Abnormal labs/tests/findings requiring intervention: lactic elevated     Patient tested for COVID 19 prior to admission: NO     OBS brochure/video discussed/provided to patient/family: No     Family present during ED course? No     Family Comments/Social Situation comments: daughter highly involved.  Went home for evening.    Tasks needing completion: None    Marla Julio, RN

## 2024-11-23 NOTE — CONSULTS
"Addendum:  SW spoke with [facility] (Phone: [phone] Fax: [phone]) intake RN, Annette, and  throughout the day.    SW was informed by Alem that she discussed case with their Medical Director, MD Espinal, and learned that the pt meets GIP criteria and that [facility] will be enrolling pt into cares.     ANDRE informed pt, her family, MD and bedside RN of above.  Informed that [facility] will be in contact with Parvin to complete intake paperwork.    No further care management intervention anticipated at this time.  Please re-consult if further needs arise.  Care management signing off.      PIERO Goldberg on 11/23/2024 at 4:17 PM      Care Management Note:    Sw received consult from provider to assist with, \"family /support and end of life issues/decision making.\"    Sw reviewed EMR, spoke with medical provider and attended IDT rounds.    MD informed SW that GIP order was made at 8:43 AM and awaiting decision from [facility] (Phone: [phone] Fax: [phone]) on determination of acceptance into GIP program.    In absence of a health care directive in the EMR, SW placed call to pt's daughter, Parvin, listed on demographic page.  Parvin shared that pt has 4 children:  Parvin, Rachna, Aishwarya, & Sen (male).  Parvin shared she is unaware of a health care directive or POLST.    ANDRE explained that our team is awaiting confirmation re: GIP status, but also discussed family & pt's wishes/desires to have pt return home.  Parvin voiced concern about pt returning to home or to a child's home, sharing she has previous hospice experience & is uncertain family can manage pt's needs.    SW to await 1:30 PM hospice consult to determine if [facility] enrolls the pt into the GIP program.    Care Management team to follow for discharge plans.    PIERO Pennington    "

## 2024-11-23 NOTE — H&P
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 heart size.    BRIAN DELANEY MD         SYSTEM ID:  [id number]       I personally reviewed no images or EKG's today

## 2024-11-23 NOTE — PROGRESS NOTES
[place] [facility] [place]    Patient admitted to room 2208 at approximately 1145 via cart from emergency room. Patient was accompanied by transport tech.     Verbal SBAR report received from DELFINO Gutiérrez prior to patient arrival.     Patient trasferred to bed via sliding board Patient alert and oriented X 2. The patient is not having any pain.  . Admission vital signs: Blood pressure 100/52, pulse 109, temperature 99.7  F (37.6  C), temperature source Axillary, resp. rate 20, height 1.6 m (5' 3\"), weight 67.7 kg (149 lb 4 oz), SpO2 93%. Patient was oriented to plan of care, call light, bed controls, tv, telephone, bathroom, and visiting hours.     Risk Assessment    The following safety risks were identified during admission: fall. Yellow risk band applied: YES.     Skin Initial Assessment    This writer admitted this patient and completed a full skin assessment and Santiago score in the Adult PCS flowsheet.   Photo documentation of skin problem and/or wound competed via [employer] application (located under Media):  No    Appropriate interventions initiated as needed.     Secondary skin check completed by Rick GORDON RN.         Education    Patient has a [place] to Observation order: No  Observation education completed and documented: N/A      Sandra Johnson RN      "
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
## 2024-11-23 NOTE — PHARMACY-CONSULT NOTE
Admission medication history completed at [facility]. Please see Medication Scribe Admission Medication History note from 11/22/2024.    Annette Cohen, PharmD

## 2024-11-23 NOTE — PROGRESS NOTES
Late entry: Patient found to have temp of 102.5 F, treated with rectal Tylenol. Lidocaine patch not applied at this time. Writer unable to identify pain site for application. Temp down to 101 F, patient more coherent, identifies pain in low back. Will apply patch.

## 2024-11-24 LAB — BACTERIA UR CULT: ABNORMAL

## 2024-11-24 PROCEDURE — 99232 SBSQ HOSP IP/OBS MODERATE 35: CPT | Performed by: STUDENT IN AN ORGANIZED HEALTH CARE EDUCATION/TRAINING PROGRAM

## 2024-11-24 PROCEDURE — 250N000013 HC RX MED GY IP 250 OP 250 PS 637: Performed by: STUDENT IN AN ORGANIZED HEALTH CARE EDUCATION/TRAINING PROGRAM

## 2024-11-24 PROCEDURE — 120N000001 HC R&B MED SURG/OB

## 2024-11-24 PROCEDURE — 110N000001 HC R&B HOSPICE

## 2024-11-24 PROCEDURE — 110N000005 HC R&B HOSPICE, ACCENT

## 2024-11-24 PROCEDURE — 250N000011 HC RX IP 250 OP 636: Performed by: STUDENT IN AN ORGANIZED HEALTH CARE EDUCATION/TRAINING PROGRAM

## 2024-11-24 RX ADMIN — HYDROMORPHONE HYDROCHLORIDE 2 MG: 5 SOLUTION ORAL at 22:59

## 2024-11-24 RX ADMIN — HYDROMORPHONE HYDROCHLORIDE 2 MG: 5 SOLUTION ORAL at 00:33

## 2024-11-24 RX ADMIN — HYDROMORPHONE HYDROCHLORIDE 2 MG: 5 SOLUTION ORAL at 09:51

## 2024-11-24 RX ADMIN — HYDROMORPHONE HYDROCHLORIDE 2 MG: 5 SOLUTION ORAL at 06:43

## 2024-11-24 RX ADMIN — HYDROMORPHONE HYDROCHLORIDE 2 MG: 5 SOLUTION ORAL at 14:46

## 2024-11-24 RX ADMIN — LIDOCAINE 1 PATCH: 4 PATCH TOPICAL at 09:50

## 2024-11-24 RX ADMIN — HYDROMORPHONE HYDROCHLORIDE 2 MG: 5 SOLUTION ORAL at 18:54

## 2024-11-24 RX ADMIN — SODIUM CHLORIDE, SODIUM LACTATE, POTASSIUM CHLORIDE, CALCIUM CHLORIDE AND DEXTROSE MONOHYDRATE 100 ML/HR: 5; 600; 310; 30; 20 INJECTION, SOLUTION INTRAVENOUS at 09:47

## 2024-11-24 NOTE — PROGRESS NOTES
Brief changed. Dilaudid administered for pain. Refused repositioning. Patient supine with head elevated 15 degrees.      Roseanne Tiwari RN on 11/24/2024 at 2:56 PM

## 2024-11-24 NOTE — PROGRESS NOTES
Offered to check and change and reposition patient. Patient refused at this time.    Roseanne Tiwari RN on 11/24/2024 at 2:10 PM

## 2024-11-24 NOTE — PROGRESS NOTES
Hospice Nurse Melissa was her to visit patient and family.    Roseanne Tiwari RN on 11/24/2024 at 12:27 PM

## 2024-11-24 NOTE — CONSULTS
Care Management Note:     Care Management team received referral due to elevated unplanned re-admission risk score.       Per IDT rounds, EMR review, discussion with pt's hospital medical provider, and after being involved with pt & family on 11/23/24, SW aware that pt has signed on the GIP hospice program with [facility] (Phone: [phone] Fax: [phone]) & they will be managing pt's cares moving forward.     No further care management intervention anticipated at this time.  Please re-consult if further needs arise.  Care management signing off.         PIERO Pennington

## 2024-11-24 NOTE — PLAN OF CARE
Problem: End-of-Life Care  Goal: Comfort, Peace and Preserved Dignity  Outcome: Progressing   Goal Outcome Evaluation:    Patient in between sleep and wake cycles. Alert and oriented when awake. Check and change offered Q 2 hours. Patient refusing check and changes multiple times. Oral pain medications as needed.
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
## 2024-11-24 NOTE — PLAN OF CARE
Problem: End-of-Life Care  Goal: Comfort, Peace and Preserved Dignity  Outcome: Progressing   Goal Outcome Evaluation:    Patient in and out of wake cycles. Family present and involved with care plan. Dialuded given PRN pain. Patient declines tylenol. Rectal tylenol in wallaru if needed. Patient check and change q 2-3 hours as patient tolerates. Declines position changes at times.    Roseanne Tiwari RN on 11/23/2024 at 7:07 PM

## 2024-11-24 NOTE — PROGRESS NOTES
Patient declined check and change and repositioning at this time. Denies need for pain medications. Pain 2/10.    Roseanne Tiwari RN on 11/24/2024 at 12:33 PM

## 2024-11-24 NOTE — PLAN OF CARE
Goal Outcome Evaluation:      Plan of Care Reviewed With: patient    Overall Patient Progress: decliningOverall Patient Progress: declining    Outcome Evaluation: Patient is started on Hospice with [facility].Patient very sleepy throughout the night. Lots of family at bedside with the Daughter Parvni who stayed the night. Patient has O2 1L/NC via mask. Patient has refused repositioning during the night. Patient is declining and and sleeping more. Liquid PO dilaudid given for pain and comfort

## 2024-11-25 LAB
BACTERIA BLD CULT: ABNORMAL
BACTERIA BLD CULT: ABNORMAL

## 2024-11-25 PROCEDURE — 110N000001 HC R&B HOSPICE

## 2024-11-25 PROCEDURE — 250N000013 HC RX MED GY IP 250 OP 250 PS 637: Performed by: STUDENT IN AN ORGANIZED HEALTH CARE EDUCATION/TRAINING PROGRAM

## 2024-11-25 PROCEDURE — 110N000005 HC R&B HOSPICE, ACCENT

## 2024-11-25 PROCEDURE — 120N000001 HC R&B MED SURG/OB

## 2024-11-25 PROCEDURE — 99231 SBSQ HOSP IP/OBS SF/LOW 25: CPT | Performed by: STUDENT IN AN ORGANIZED HEALTH CARE EDUCATION/TRAINING PROGRAM

## 2024-11-25 RX ORDER — HYDROMORPHONE HYDROCHLORIDE 1 MG/ML
4 SOLUTION ORAL
Status: DISCONTINUED | OUTPATIENT
Start: 2024-11-25 | End: 2024-11-25

## 2024-11-25 RX ORDER — HYDROMORPHONE HYDROCHLORIDE 1 MG/ML
4 SOLUTION ORAL EVERY 12 HOURS PRN
Status: DISCONTINUED | OUTPATIENT
Start: 2024-11-25 | End: 2024-11-25

## 2024-11-25 RX ORDER — HYDROMORPHONE HYDROCHLORIDE 1 MG/ML
4 SOLUTION ORAL
Status: DISCONTINUED | OUTPATIENT
Start: 2024-11-25 | End: 2024-11-26

## 2024-11-25 RX ADMIN — HYDROMORPHONE HYDROCHLORIDE 3 MG: 5 SOLUTION ORAL at 21:25

## 2024-11-25 RX ADMIN — HYDROMORPHONE HYDROCHLORIDE 2 MG: 5 SOLUTION ORAL at 15:18

## 2024-11-25 RX ADMIN — HYDROMORPHONE HYDROCHLORIDE 2 MG: 5 SOLUTION ORAL at 19:21

## 2024-11-25 RX ADMIN — HYDROMORPHONE HYDROCHLORIDE 2 MG: 5 SOLUTION ORAL at 08:37

## 2024-11-25 RX ADMIN — HYDROMORPHONE HYDROCHLORIDE 2 MG: 5 SOLUTION ORAL at 02:06

## 2024-11-25 RX ADMIN — LIDOCAINE 1 PATCH: 4 PATCH TOPICAL at 10:34

## 2024-11-25 RX ADMIN — HYDROMORPHONE HYDROCHLORIDE 2 MG: 5 SOLUTION ORAL at 17:16

## 2024-11-25 RX ADMIN — HYDROMORPHONE HYDROCHLORIDE 2 MG: 5 SOLUTION ORAL at 05:30

## 2024-11-25 RX ADMIN — HYDROMORPHONE HYDROCHLORIDE 2 MG: 5 SOLUTION ORAL at 10:34

## 2024-11-25 RX ADMIN — HYDROMORPHONE HYDROCHLORIDE 2 MG: 5 SOLUTION ORAL at 13:04

## 2024-11-25 ASSESSMENT — ACTIVITIES OF DAILY LIVING (ADL)
CONCENTRATING,_REMEMBERING_OR_MAKING_DECISIONS_DIFFICULTY: NO
DIFFICULTY_COMMUNICATING: NO
ADLS_ACUITY_SCORE: 0
ADLS_ACUITY_SCORE: 50
ADLS_ACUITY_SCORE: 0
TOILETING_ISSUES: YES
ADLS_ACUITY_SCORE: 0
ADLS_ACUITY_SCORE: 50
ADLS_ACUITY_SCORE: 0
DRESSING/BATHING_DIFFICULTY: YES
ADLS_ACUITY_SCORE: 0
ADLS_ACUITY_SCORE: 50
ADLS_ACUITY_SCORE: 0
ADLS_ACUITY_SCORE: 50
ADLS_ACUITY_SCORE: 0
HEARING_DIFFICULTY_OR_DEAF: NO
ADLS_ACUITY_SCORE: 50
ADLS_ACUITY_SCORE: 50
DIFFICULTY_EATING/SWALLOWING: NO
ADLS_ACUITY_SCORE: 50
CHANGE_IN_FUNCTIONAL_STATUS_SINCE_ONSET_OF_CURRENT_ILLNESS/INJURY: YES
WALKING_OR_CLIMBING_STAIRS_DIFFICULTY: YES
TOILETING_ASSISTANCE: TOILETING DIFFICULTY, ASSISTANCE 1 PERSON
WEAR_GLASSES_OR_BLIND: NO
DRESSING/BATHING: DRESSING DIFFICULTY, ASSISTANCE 1 PERSON
ADLS_ACUITY_SCORE: 50
FALL_HISTORY_WITHIN_LAST_SIX_MONTHS: NO
ADLS_ACUITY_SCORE: 0
ADLS_ACUITY_SCORE: 0
ADLS_ACUITY_SCORE: 50
ADLS_ACUITY_SCORE: 50
WALKING_OR_CLIMBING_STAIRS: AMBULATION DIFFICULTY, ASSISTANCE 1 PERSON;STAIR CLIMBING DIFFICULTY, ASSISTANCE 1 PERSON;TRANSFERRING DIFFICULTY, ASSISTANCE 1 PERSON
DOING_ERRANDS_INDEPENDENTLY_DIFFICULTY: NO
ADLS_ACUITY_SCORE: 0
ADLS_ACUITY_SCORE: 0

## 2024-11-25 NOTE — PROGRESS NOTES
Patient opens eyes to touch. Oriented to self only.   VS:  T 98.8, O2 78% on RA, P 106 weak, BP 89/47, RR 12 shallow    2+ edema to BLE, 2+ edema right hand, 1+ edema to left hand.  Respirations shallow.    Colostomy patent, black liquid stool observed. Jejunostomy patent, black liquid stool observed.    Staff nurse recorded urinary output via pure wick this am. Family reports incontinence output earlier in the day.    Patient frowns and winces in pain during repositioning and log rolling. Currently receiving dilaudid 2mg, 1 tab PO Q 2 HRS PRN. Writer suggested to staff nurse increasing to dilaudid 4mg, 1 tab PO Q 2 HRS PRN to make patient comfortable.    Patient denied nausea. No jaundice observed. Patient intermittently febrile. Refuses tylenol. Cool wash cloths applied to forehead for comfort. Patient diaphoretic. Shivers when blankets removed.    Writer explained levels of hospice care to family. Routine home care versus GIP care and the requirements. Educated on home care hospice level of care. Patient's daughters and son will discuss the situation today.   Writer updated Dr. Oconnor and the charge nurse. Reviewed the options of discharging to home hospice or to hospital observation without hospice if patient's condition stabilizes. Will continue to observe overnight and reassess at 09:30 during rounds.  Writer updated hospice care team.  Writer updated family.    Laureen Cespedes RN CM

## 2024-11-25 NOTE — PROGRESS NOTES
"SPIRITUAL HEALTH SERVICES  [facility] - Med Surg    Referral Source: Patient's Daughter request    - Patient, Michelle, transitioned to [place] [facility] care.  I introduced myself and my role and was welcomed by the daughters, Parvin and Rachna, who were bedside.  Parvin shared about her Mom's diagnosis and the time she has spent most recently at [place] (see other notes for dates and locations).    - I asked if her Mom had a Protestant affiliation and she said \"nothing current.\"  I provided supportive listening as they spoke of their commitment to provide care and the challenges of not being able to \"just drop everything\".  Rachna stated that the plan was for her Mom to pass here.      - I stated that I would check in on occasion and help in any way that they would need.    Plan:  will remain available for any ongoing support needs during LOS.    Dagoberto Mckeon M.A., [facility]  Staff   [facility]  Office: [phone]    "

## 2024-11-25 NOTE — PROGRESS NOTES
"CLINICAL NUTRITION SERVICES - BRIEF NOTE    Chart reviewed due to positive nutrition risk screen.  Noted patient is on comfort cares/going home on hospice. RD will sign-off, but please re-consult if plan of care changes.     Debra Hobbs RDN, LD  Clinical Dietitian  Office: [phone]  Hours: M-F 8-3pm   Weekend/Holiday [facility] - \"Weekend Clinical Dietitian\" (No longer using pager)    "

## 2024-11-25 NOTE — MEDICATION SCRIBE - ADMISSION MEDICATION HISTORY
Medication Scribe Admission Medication History    Admission medication history is complete. The information provided in this note is only as accurate as the sources available at the time of the update.    Information Source(s): Hospital records via  chart    Pertinent Information: Patient going to comfort cares upon return from NH facility.    Changes made to PTA medication list:  Added: None  Deleted: None  Changed: None    Allergies reviewed with patient and updates made in EHR: yes, no change.    Medication History Completed By: Marisol Forte 11/25/2024 12:06 PM    PTA Med List   Medication Sig Last Dose/Taking    acetaminophen (TYLENOL) 650 MG suppository Place 1 suppository (650 mg) rectally every 6 hours as needed for mild pain or fever (temperature over 100  F ). Unknown    acetaminophen (TYLENOL) 650 MG/20.3ML liquid Take 20.3 mLs (650 mg) by mouth every 6 hours as needed for mild pain or fever (temperature over 100  F). Unknown    atropine 1 % ophthalmic solution Place 2 drops under the tongue every 4 hours as needed for secretions. Unknown    bisacodyl (DULCOLAX) 10 MG suppository Place 1 suppository (10 mg) rectally once as needed for other (for no bowel movement for 72 hours). Unknown    carboxymethylcellulose PF (REFRESH PLUS) 0.5 % ophthalmic solution Place 1-2 drops into both eyes every hour as needed for dry eyes. Unknown    dextrose 5% in lactated ringers infusion Inject 100 mL/hr at 100 mL/hr into the vein continuously. Unknown    haloperidol lactate (HALDOL) 5 MG/ML injection Inject 0.2 mLs (1 mg) over 1 minutes into the vein every hour as needed for agitation. Unknown    Lidocaine (LIDOCARE) 4 % Patch Place 1 patch over 12 hours onto the skin every 24 hours. To prevent lidocaine toxicity, patient should be patch free for 12 hrs daily. Unknown    LORazepam (ATIVAN) 1 MG tablet Place 1 tablet (1 mg) under the tongue every 3 hours as needed for anxiety. Unknown    LORazepam (ATIVAN) 2 MG/ML  injection Inject 0.5 mLs (1 mg) into the vein every 3 hours as needed for anxiety. Unknown    mineral oil-hydrophilic petrolatum (AQUAPHOR) external ointment Apply topically every hour as needed for dry skin. Unknown    nicotine (NICODERM CQ) 7 MG/24HR 24 hr patch Place 1 patch over 24 hours onto the skin daily as needed for nicotine withdrawal symptoms. Unknown    ondansetron (ZOFRAN) 2 MG/ML injection Inject 2 mLs (4 mg) over 2-5 minutes into the vein every 6 hours as needed for nausea or vomiting. Unknown    prochlorperazine (COMPAZINE) 5 MG/ML injection Inject 1 mL (5 mg) over 1-2 minutes into the vein every 6 hours as needed for nausea or agitation. Unknown

## 2024-11-25 NOTE — PLAN OF CARE
Problem: Adult Inpatient Plan of Care  Goal: Optimal Comfort and Wellbeing  Intervention: Monitor Pain and Promote Comfort  Recent Flowsheet Documentation  Taken 11/24/2024 3116 by Sandra Johnson RN  Pain Management Interventions:   medication (see MAR)   care clustered   emotional support   medication offered but refused   pain management plan reviewed with patient/caregiver   distraction   rest   Goal Outcome Evaluation:      Plan of Care Reviewed With: patient, family    Overall Patient Progress: decliningOverall Patient Progress: declining    Outcome Evaluation: Patient status is declining. Patient sleeping more throughout the shift. When awake patient is A/O and able to answer. Liquid PO dilaudid given for pain and comfort. Patient refuses to be repositioned. With lots of encouragement patient agreed to be checked to see if brief needed to be changed. Brief changed and joaquin cares completed. Very small amount of incont urine x1.  O2 1L via mask. Daughters at bedside. Per daughters request IV fluids were stopped.
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
## 2024-11-26 PROCEDURE — 120N000001 HC R&B MED SURG/OB

## 2024-11-26 PROCEDURE — 250N000013 HC RX MED GY IP 250 OP 250 PS 637: Performed by: STUDENT IN AN ORGANIZED HEALTH CARE EDUCATION/TRAINING PROGRAM

## 2024-11-26 PROCEDURE — 250N000013 HC RX MED GY IP 250 OP 250 PS 637: Performed by: INTERNAL MEDICINE

## 2024-11-26 PROCEDURE — 110N000005 HC R&B HOSPICE, ACCENT

## 2024-11-26 PROCEDURE — 110N000001 HC R&B HOSPICE

## 2024-11-26 RX ORDER — HYDROMORPHONE HYDROCHLORIDE 1 MG/ML
4 SOLUTION ORAL
Status: DISCONTINUED | OUTPATIENT
Start: 2024-11-26 | End: 2024-11-30 | Stop reason: HOSPADM

## 2024-11-26 RX ORDER — NALOXONE HYDROCHLORIDE 0.4 MG/ML
0.1 INJECTION, SOLUTION INTRAMUSCULAR; INTRAVENOUS; SUBCUTANEOUS
Status: DISCONTINUED | OUTPATIENT
Start: 2024-11-26 | End: 2024-11-30 | Stop reason: HOSPADM

## 2024-11-26 RX ORDER — SENNOSIDES 8.6 MG
1 TABLET ORAL 2 TIMES DAILY PRN
Status: DISCONTINUED | OUTPATIENT
Start: 2024-11-26 | End: 2024-11-30 | Stop reason: HOSPADM

## 2024-11-26 RX ORDER — BISACODYL 10 MG
10 SUPPOSITORY, RECTAL RECTAL
Status: DISCONTINUED | OUTPATIENT
Start: 2024-11-29 | End: 2024-11-30 | Stop reason: HOSPADM

## 2024-11-26 RX ORDER — MINERAL OIL/HYDROPHIL PETROLAT
OINTMENT (GRAM) TOPICAL
Status: DISCONTINUED | OUTPATIENT
Start: 2024-11-26 | End: 2024-11-26

## 2024-11-26 RX ORDER — NALOXONE HYDROCHLORIDE 0.4 MG/ML
0.2 INJECTION, SOLUTION INTRAMUSCULAR; INTRAVENOUS; SUBCUTANEOUS
Status: DISCONTINUED | OUTPATIENT
Start: 2024-11-26 | End: 2024-11-30 | Stop reason: HOSPADM

## 2024-11-26 RX ORDER — CARBOXYMETHYLCELLULOSE SODIUM 5 MG/ML
1-2 SOLUTION/ DROPS OPHTHALMIC
Status: DISCONTINUED | OUTPATIENT
Start: 2024-11-26 | End: 2024-11-30 | Stop reason: HOSPADM

## 2024-11-26 RX ADMIN — HYDROMORPHONE HYDROCHLORIDE 2 MG: 5 SOLUTION ORAL at 21:17

## 2024-11-26 RX ADMIN — HYDROMORPHONE HYDROCHLORIDE 2 MG: 5 SOLUTION ORAL at 16:24

## 2024-11-26 RX ADMIN — HYDROMORPHONE HYDROCHLORIDE 4 MG: 5 SOLUTION ORAL at 09:56

## 2024-11-26 RX ADMIN — HYDROMORPHONE HYDROCHLORIDE 3 MG: 5 SOLUTION ORAL at 00:14

## 2024-11-26 RX ADMIN — HYDROMORPHONE HYDROCHLORIDE 3 MG: 5 SOLUTION ORAL at 04:30

## 2024-11-26 RX ADMIN — HYDROMORPHONE HYDROCHLORIDE 3 MG: 5 SOLUTION ORAL at 19:28

## 2024-11-26 RX ADMIN — HYDROMORPHONE HYDROCHLORIDE 3 MG: 5 SOLUTION ORAL at 08:06

## 2024-11-26 RX ADMIN — HYDROMORPHONE HYDROCHLORIDE 3 MG: 5 SOLUTION ORAL at 12:47

## 2024-11-26 ASSESSMENT — ACTIVITIES OF DAILY LIVING (ADL)
ADLS_ACUITY_SCORE: 50
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
## 2024-11-26 NOTE — PROGRESS NOTES
Patient oriented to self only.   VS: T 99.3, O2 sat 85% on RA, P 97 irregular, RR 13    Non-verbal signs of pain 10/10. Dilaudid 4mg given. Recommendation to increase to Dilaudid 4mg Q 1 HR PRN. Order for Vazquez catheter obtained. Writer assissted DELFINO Escobar in placing catheter. Brown urine with mucous strands, sediment and bloody discharge, 400cc drained.    Due to patient level of care decreasing to routine, patient no longer qualifies for [facility] level of care. Charge nurse updated. Hospice social worker and both care teams updated.    Writer updated Naz and answered questions about routine home hospice level of care.    Laureen Cespedes RN CM

## 2024-11-26 NOTE — PLAN OF CARE
Problem: End-of-Life Care  Goal: Comfort, Peace and Preserved Dignity  Outcome: Progressing   Goal Outcome Evaluation:    Patient continues to be in and out of sleep/wake cycles but progressively lengthening. Pain controlled with oral dilauded. Dosing and schedule adjusted to patients pain level and need for pain medications. Daughters involved with care plan at this time. Vazquez catheter inserted for patient comfort. Turning at patient/ family request.     Plan Continue Hospice care coordination with [facility] over night. [facility] to reassess patient in the morning.

## 2024-11-26 NOTE — PROGRESS NOTES
Family denied having patient moved or change positions  Pain medication administered per family wishes.

## 2024-11-26 NOTE — PLAN OF CARE
Goal Outcome Evaluation:      Plan of Care Reviewed With: child, caregiver          Outcome Evaluation: If pt is discharged from [facility] hospice, pt's daughter's Parvin & Vilma, will bring the pt home and provide cares for her at home [facility].

## 2024-11-26 NOTE — PROGRESS NOTES
Patient having more pain. Request to provider to increase hydromorphone to every hour PRN vs every 2 hours. Vazquez catheter placed per verbal order read back by Dr. Blackman and per family request.     Gin care provided and patient turned slightly to right side.      Roseanne Tiwari RN on 11/26/2024 at 11:06 AM

## 2024-11-26 NOTE — PHARMACY-CONSULT NOTE
Admission medication history completed at [facility]. Please see Medication Scribe Admission Medication History note from 11/22/2024.     Amy Padgett RPH on 11/26/2024 at 3:02 PM

## 2024-11-26 NOTE — PROGRESS NOTES
Care Management Note:    SW involved with pt's plan of care as notification from [facility] hospice RN during IDT rounds was that pt was no longer meeting criteria for program and discharge planning could occur.    SW reviewed pt's EMR, spoke with bedside RN, MD and with pt's daughter's Parvin Esparza, who shared they would prefer that pt remain in the hospital and family pay for OBS costs.  SW unfamiliar with this status, explained that once a pt no longer meets GIP criteria, it meant the pt was medically stable to discharge from the hospital to next location for end of life cares, whether it be to a facility or to a private home.    Parvin shared she was informed pt could remain as OBS status, but they may have to pay for this cares.  SW educated on hospital bed status/criteria and the role of SW for assisting with creating a safe discharge plan of care.      Discussed discharge to a facility would be privately paid for, costs of cares, and locations typically available.  SW also educated on [facility], a no-cost hospice house, and bringing the pt to home (hers vs one of the children's) & having family provide end of life cares at home with support of [facility] team.  Parvin Esparza voiced their concern of pt's pain during movement, increased pain with any personal cares, and the potential of her dying during transport.  ANDRE informed that each day, our medical provider will do her/his best to inform the CM team if pt is able to leave the hospital and continue end of life cares with a hospice team outside of the hospital.  However, ANDRE also explained that no one knows when a person may die, our hospital staff will not move the pt if she is actively/imminently dying.    Parvin Esparza informed writer they would bring the pt to the pt's home, utilize [facility] for cares.  Pt will need a bed, over bed table, medications at time of discharge, and colostomy supplies.      ANDRE discussed with Alem  of  Business with [facility], pt and her care needs.  Discussed that pt's medications were increased by hospice RN today, while also documented that pt was being discharged from GIP.   Alem conferred with Clinical Lead from [facility], who shared that pt will remain GIP for 1 more night due to increase in pain.  ANDRE informed MD, pt's family & bedside RN.    ANDRE left message for Sharon Oneill, [facility] SW, [phone], that when pt no longer meets GIP criteria & is safe to discharge form hospital, pt will go home with her family caring for her & continued cares via [facility].  Pt will need a bed, over bed table, ostomy supplies, gloves, and medications at time of discharge.    Care Management team to follow for discharge plans.    PIERO Pennington

## 2024-11-26 NOTE — PLAN OF CARE
Goal Outcome Evaluation:    Problem: End-of-Life Care  Goal: Comfort, Peace and Preserved Dignity  Intervention: Promote Physical Comfort  Flowsheets (Taken 11/26/2024 0136)  Airway/Ventilation Support: comfort measures provided  Sensory Stimulation Regulation:   auditory stimulation minimized   television on  Environmental Support:   calm environment promoted   environmental consistency promoted   Writer checking with daughter for pain medication amounts.  Patient taking oral Dilaudid in syringe form.  Patient resting quietly after medication administered

## 2024-11-27 VITALS — RESPIRATION RATE: 16 BRPM

## 2024-11-27 PROCEDURE — 250N000013 HC RX MED GY IP 250 OP 250 PS 637: Performed by: INTERNAL MEDICINE

## 2024-11-27 PROCEDURE — 110N000005 HC R&B HOSPICE, ACCENT

## 2024-11-27 PROCEDURE — 120N000001 HC R&B MED SURG/OB

## 2024-11-27 PROCEDURE — 110N000001 HC R&B HOSPICE

## 2024-11-27 RX ORDER — LORAZEPAM 2 MG/ML
1 CONCENTRATE ORAL
Status: DISCONTINUED | OUTPATIENT
Start: 2024-11-27 | End: 2024-11-30 | Stop reason: HOSPADM

## 2024-11-27 RX ADMIN — HYDROMORPHONE HYDROCHLORIDE 2 MG: 5 SOLUTION ORAL at 05:28

## 2024-11-27 RX ADMIN — HYDROMORPHONE HYDROCHLORIDE 2 MG: 5 SOLUTION ORAL at 08:08

## 2024-11-27 RX ADMIN — HYDROMORPHONE HYDROCHLORIDE 2 MG: 5 SOLUTION ORAL at 14:38

## 2024-11-27 RX ADMIN — LORAZEPAM 1 MG: 2 SOLUTION, CONCENTRATE ORAL at 11:01

## 2024-11-27 RX ADMIN — HYDROMORPHONE HYDROCHLORIDE 2 MG: 5 SOLUTION ORAL at 00:46

## 2024-11-27 RX ADMIN — HYDROMORPHONE HYDROCHLORIDE 2 MG: 5 SOLUTION ORAL at 02:42

## 2024-11-27 RX ADMIN — LORAZEPAM 1 MG: 2 SOLUTION, CONCENTRATE ORAL at 21:45

## 2024-11-27 RX ADMIN — HYDROMORPHONE HYDROCHLORIDE 3 MG: 5 SOLUTION ORAL at 10:11

## 2024-11-27 RX ADMIN — HYDROMORPHONE HYDROCHLORIDE 2 MG: 5 SOLUTION ORAL at 17:38

## 2024-11-27 RX ADMIN — HYDROMORPHONE HYDROCHLORIDE 3 MG: 5 SOLUTION ORAL at 21:07

## 2024-11-27 ASSESSMENT — ACTIVITIES OF DAILY LIVING (ADL)
ADLS_ACUITY_SCORE: 50

## 2024-11-27 NOTE — PROGRESS NOTES
Care Management Follow Up    Length of Stay (days): 4    Expected Discharge Date: 11/28/2024     Concerns to be Addressed: discharge planning       Patient plan of care discussed at interdisciplinary rounds: Yes    Anticipated Discharge Disposition: Long Term Placement with Hospice      Anticipated Discharge Services: Transportation Services    Anticipated Discharge DME:  Hospital Bed    Patient/family educated on Medicare website which has current facility and service quality ratings: no    Education Provided on the Discharge Plan: Yes  Patient/Family in Agreement with the Plan: yes    Referrals Placed by CM/SW:  [facility]     Private pay costs discussed: transportation costs    Discussed  Partnership in Safe Discharge Planning  document with patient/family: Yes    Handoff Completed: No, handoff not indicated or clinically appropriate    Additional Information:  Update received from the [facility] Team and MD this am during the IDT meeting.     Informed the Patient does not meet the [facility] program any longer     Informed Pt will need to be transitioned to LTC     CM met with the Pt's daughter Parvin # [phone]  Dtr Vilma    - Discussed private costs of SNF placement  Clarified with the family that LTC placement will be private pay  SNF require a deposit of $5-$7,000 on admission    Family has several questions as to why LTC placement would be private pay vs having insurance coverage. CM explained in great length they Medicare would not cover LTC with hospice     Inquired with [facility]--unfortunately - F[facility]       Daughters requested LTC applications be sent to :    1) [facility]   RN report: [phone] fax: [phone]  [facility] ACCEPTED-- Friday 11/29  Shared Room. $5,000 Deposit on Admission     2) [facility]   Phone:[phone] Admissions Phone:[phone] Fax: [phone]   [facility] ACCEPTED-- Friday 11/29  Shared Room. $7,000 Deposit on Admission    Family  "distressed and expressed great frustration over the situation and stated,   \" This has been a loop over a loop!\"     Daughters stated \"We want to be able to stay with the patient over night.\"   \"Can we do this at the [facility] nursing homes?\"  CM confirmed, family cannot sleep overnight, both options are shared rooms.     3rd option explored--Hospice Residence: Family declined as they are not close to the family. They do not want to travel that far.       Next Steps: CM to follow to continue to work on a disposition plan        PIERO Knight  [facility] [phone]   [facility]  [phone]          "

## 2024-11-27 NOTE — PLAN OF CARE
Problem: End-of-Life Care  Goal: Comfort, Peace and Preserved Dignity  Outcome: Progressing   Goal Outcome Evaluation:    Patient appears more comfortable today. Has been sleeping more. Pain controlled with hydromorphone 2 mg PRN pain along with ativan. Ativan was given for anxiousness. Cares were much easier and patient exhibited less signs of pain after receiving this medication. Patient taking in spoon fed water as tolerated. Daughter Parvin has been at bedside.    Plan: Transfer to [facility] Friday for continued hospice care. Continue Inpatient Hospice Care at this time.    Roseanne Tiwari RN on 11/27/2024 at 7:20 PM

## 2024-11-27 NOTE — PROGRESS NOTES
Patient wakes to touch. Not oriented to self anymore  RR 13    No non-verbal signs of pain observed. Shallow, even respirations.  Catheter draining sanguinous urine with sediment.    Daughter at bedside.    Met with hospital care team during rounds and reviewed patient status and medications. Patient was decreased from Dilaudid 4mg liquid concentrate PO Q 1 HR PRN to Dilaudid 2mg liquid concentrate PO Q 2 HR PRN per family request.    GIP eligibility reviewed . No longer meets GIP eligibility. Discharge plans in progress.    Laureen Cespedes RN CM

## 2024-11-27 NOTE — PLAN OF CARE
Problem: End-of-Life Care  Goal: Comfort, Peace and Preserved Dignity  Outcome: Progressing   Goal Outcome Evaluation:  Patient's daughters at bedside through out writers shift  Patient talks directly to her daughter  SL administered per daughter's wishes.

## 2024-11-27 NOTE — PROGRESS NOTES
Patients family asking for pain medications to be given. Octaviano MCFARLAND RN brought in for second opinion. Patients resps are 18/min, facial expressions relaxed and body is relaxed. No moaning at this time. Family encouraged to call if patient starts to exhibit signs of pain. Family agrees with plan.    Roseanne Tiwari RN on 11/27/2024 at 4:13 PM

## 2024-11-27 NOTE — PROGRESS NOTES
Ativan given to help relieve anxiety and relive pain/anxiety related to performing check and change cares.

## 2024-11-28 PROCEDURE — 110N000005 HC R&B HOSPICE, ACCENT

## 2024-11-28 PROCEDURE — 250N000013 HC RX MED GY IP 250 OP 250 PS 637: Performed by: INTERNAL MEDICINE

## 2024-11-28 PROCEDURE — 120N000001 HC R&B MED SURG/OB

## 2024-11-28 PROCEDURE — 99231 SBSQ HOSP IP/OBS SF/LOW 25: CPT | Mod: GV | Performed by: INTERNAL MEDICINE

## 2024-11-28 PROCEDURE — 110N000001 HC R&B HOSPICE

## 2024-11-28 RX ADMIN — LORAZEPAM 1 MG: 2 SOLUTION, CONCENTRATE ORAL at 12:19

## 2024-11-28 RX ADMIN — HYDROMORPHONE HYDROCHLORIDE 3 MG: 5 SOLUTION ORAL at 08:37

## 2024-11-28 RX ADMIN — HYDROMORPHONE HYDROCHLORIDE 2 MG: 5 SOLUTION ORAL at 23:50

## 2024-11-28 RX ADMIN — HYDROMORPHONE HYDROCHLORIDE 3 MG: 5 SOLUTION ORAL at 02:54

## 2024-11-28 RX ADMIN — HYDROMORPHONE HYDROCHLORIDE 2 MG: 5 SOLUTION ORAL at 19:27

## 2024-11-28 RX ADMIN — LORAZEPAM 1 MG: 2 SOLUTION, CONCENTRATE ORAL at 07:06

## 2024-11-28 RX ADMIN — HYDROMORPHONE HYDROCHLORIDE 2 MG: 5 SOLUTION ORAL at 16:02

## 2024-11-28 RX ADMIN — HYDROMORPHONE HYDROCHLORIDE 2 MG: 5 SOLUTION ORAL at 13:46

## 2024-11-28 RX ADMIN — HYDROMORPHONE HYDROCHLORIDE 3 MG: 5 SOLUTION ORAL at 11:36

## 2024-11-28 RX ADMIN — LORAZEPAM 1 MG: 2 SOLUTION, CONCENTRATE ORAL at 04:09

## 2024-11-28 RX ADMIN — LORAZEPAM 1 MG: 2 SOLUTION, CONCENTRATE ORAL at 20:51

## 2024-11-28 RX ADMIN — LORAZEPAM 1 MG: 2 SOLUTION, CONCENTRATE ORAL at 17:30

## 2024-11-28 RX ADMIN — HYDROMORPHONE HYDROCHLORIDE 2 MG: 5 SOLUTION ORAL at 21:43

## 2024-11-28 RX ADMIN — HYDROMORPHONE HYDROCHLORIDE 3 MG: 5 SOLUTION ORAL at 06:42

## 2024-11-28 ASSESSMENT — ACTIVITIES OF DAILY LIVING (ADL)
ADLS_ACUITY_SCORE: 50

## 2024-11-28 NOTE — PLAN OF CARE
Plan of Care Reviewed With: child, caregiver    Goal: Plan of Care Review  Flowsheets (Taken 11/28/2024 1120)  Plan of Care Reviewed With:   child   caregiver  Overall Patient Progress: declining    Pt continues to rest comfortably, no signs of distress/pain. PRN Dilaudid approx Q2. PRN Ativan as needed, see MAR for administration times and amounts. Vazquez catheter patent with good output. Denied repositioning. Daughters remain at bedside, very positive, and involved with cares.

## 2024-11-28 NOTE — PLAN OF CARE
Problem: Adult Inpatient Plan of Care  Goal: Optimal Comfort and Wellbeing  Intervention: Monitor Pain and Promote Comfort  Recent Flowsheet Documentation  Taken 11/28/2024 9231 by Opal Monk RN  Pain Management Interventions:   medication (see MAR)   care clustered   distraction   emotional support   massage provided   quiet environment facilitated   rest
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
## 2024-11-28 NOTE — PROGRESS NOTES
Patient resting comfortably with family at bedside. Patient getting PRN Dilaudid 3mg per family request and PRN Ativan 1mg. Patient has a rausch catheter that is patent. Patient/family refuse repositioning. Patient is more sleepy and appears comfortable.

## 2024-11-29 PROCEDURE — 110N000001 HC R&B HOSPICE

## 2024-11-29 PROCEDURE — 250N000011 HC RX IP 250 OP 636: Performed by: STUDENT IN AN ORGANIZED HEALTH CARE EDUCATION/TRAINING PROGRAM

## 2024-11-29 PROCEDURE — 250N000013 HC RX MED GY IP 250 OP 250 PS 637: Performed by: INTERNAL MEDICINE

## 2024-11-29 PROCEDURE — 120N000001 HC R&B MED SURG/OB

## 2024-11-29 PROCEDURE — 250N000009 HC RX 250: Performed by: STUDENT IN AN ORGANIZED HEALTH CARE EDUCATION/TRAINING PROGRAM

## 2024-11-29 PROCEDURE — 99231 SBSQ HOSP IP/OBS SF/LOW 25: CPT | Mod: GV | Performed by: INTERNAL MEDICINE

## 2024-11-29 PROCEDURE — 250N000013 HC RX MED GY IP 250 OP 250 PS 637: Performed by: PHYSICIAN ASSISTANT

## 2024-11-29 PROCEDURE — 110N000005 HC R&B HOSPICE, ACCENT

## 2024-11-29 RX ORDER — MORPHINE SULFATE 20 MG/ML
5 SOLUTION ORAL
Status: DISCONTINUED | OUTPATIENT
Start: 2024-11-29 | End: 2024-11-30 | Stop reason: HOSPADM

## 2024-11-29 RX ORDER — MORPHINE SULFATE 20 MG/ML
10 SOLUTION ORAL
Status: DISCONTINUED | OUTPATIENT
Start: 2024-11-29 | End: 2024-11-30 | Stop reason: HOSPADM

## 2024-11-29 RX ADMIN — MORPHINE SULFATE 5 MG: 10 SOLUTION ORAL at 15:05

## 2024-11-29 RX ADMIN — ATROPINE SULFATE 2 DROP: 10 SOLUTION/ DROPS OPHTHALMIC at 22:49

## 2024-11-29 RX ADMIN — MORPHINE SULFATE 10 MG: 20 SOLUTION ORAL at 16:41

## 2024-11-29 RX ADMIN — HYDROMORPHONE HYDROCHLORIDE 2 MG: 5 SOLUTION ORAL at 07:31

## 2024-11-29 RX ADMIN — HYDROMORPHONE HYDROCHLORIDE 2 MG: 5 SOLUTION ORAL at 10:06

## 2024-11-29 RX ADMIN — HYDROMORPHONE HYDROCHLORIDE 2 MG: 5 SOLUTION ORAL at 03:37

## 2024-11-29 RX ADMIN — LORAZEPAM 1 MG: 2 INJECTION INTRAMUSCULAR; INTRAVENOUS at 13:49

## 2024-11-29 RX ADMIN — HYDROMORPHONE HYDROCHLORIDE 1 MG: 5 SOLUTION ORAL at 12:36

## 2024-11-29 RX ADMIN — LORAZEPAM 1 MG: 2 INJECTION INTRAMUSCULAR; INTRAVENOUS at 10:26

## 2024-11-29 RX ADMIN — MORPHINE SULFATE 10 MG: 20 SOLUTION ORAL at 22:58

## 2024-11-29 RX ADMIN — ATROPINE SULFATE 2 DROP: 10 SOLUTION/ DROPS OPHTHALMIC at 18:28

## 2024-11-29 RX ADMIN — HYDROMORPHONE HYDROCHLORIDE 2 MG: 5 SOLUTION ORAL at 12:19

## 2024-11-29 RX ADMIN — LORAZEPAM 1 MG: 2 INJECTION INTRAMUSCULAR; INTRAVENOUS at 03:53

## 2024-11-29 RX ADMIN — LORAZEPAM 1 MG: 2 INJECTION INTRAMUSCULAR; INTRAVENOUS at 21:27

## 2024-11-29 RX ADMIN — ATROPINE SULFATE 2 DROP: 10 SOLUTION/ DROPS OPHTHALMIC at 15:07

## 2024-11-29 RX ADMIN — MORPHINE SULFATE 10 MG: 20 SOLUTION ORAL at 20:58

## 2024-11-29 RX ADMIN — LORAZEPAM 1 MG: 2 INJECTION INTRAMUSCULAR; INTRAVENOUS at 17:27

## 2024-11-29 RX ADMIN — MORPHINE SULFATE 10 MG: 20 SOLUTION ORAL at 18:34

## 2024-11-29 ASSESSMENT — ACTIVITIES OF DAILY LIVING (ADL)
ADLS_ACUITY_SCORE: 56
ADLS_ACUITY_SCORE: 50
ADLS_ACUITY_SCORE: 50
ADLS_ACUITY_SCORE: 56

## 2024-11-29 NOTE — PROGRESS NOTES
Patient resting comfortably with family at bedside. Family is requesting for PRN Po liquid dilaudid and PRN po liquid Ativan. Vazquez catheter in place and patent. Family refuse repositioning patient and will let staff know when they want patient to be repositioned. Illeostomy and colostomy present.

## 2024-11-29 NOTE — PROGRESS NOTES
Care Management Follow Up    Length of Stay (days): 6    Expected Discharge Date: 11/29/2024     Concerns to be Addressed: discharge planning       Patient plan of care discussed at interdisciplinary rounds: Yes    Anticipated Discharge Disposition: LTC placement with Hospice       Anticipated Discharge Services: Hospice, Transportation Services    Anticipated Discharge DME:  (hospice to provide bed, over bed table, ostomy supplies, and pain medications)    Patient/family educated on Medicare website which has current facility and service quality ratings: no    Education Provided on the Discharge Plan: Yes  Patient/Family in Agreement with the Plan: yes    Referrals Placed by CM/SW:    Private pay costs discussed: private room/amenity fees and transportation costs    --$5,000 Private Pay Deposit     Discussed  Partnership in Safe Discharge Planning  document with patient/family: Yes    Handoff Completed: Yes, [facility] PCP: Internal handoff referral completed    Additional Information:  Update received during IDT rounds. Informed Pt remains medically stable to discharge once a LTC bed become available    CM met with the daughters today in Pt's room  Extensive conversation regarding the pt's status, need for placement.     Again reviewed pt's daughter wishes why the patient could not remain in the hospital through end of life.     Discussed the need to transitioned to a less restrictive environment. Pt no longer requires hospital level of care.     Receives notification from Dawson's Director from [place]. [facility] could offer a Private Room     Requires a $5,000 deposit at time of admission.    Daughter Parvin and Nelsy stated they have been talking with their siblings regarding options for home with hospice vs LTC    Family again goes back and forth as to what the best option might be as they feel the Patient is actively dying.     Daughter Parvin voiced being pleased with having a Private Room at [facility] vs  "the Shared room options provided on [place].    Agreed to go and tour the Room available.   Bed offered is in the TCU unit at [facility] due to the Pt's limited life expectancy.     Awaiting update from Family with acceptance on Bed at [facility]   Family voiced they feel the Patient has \"hours\" and is actively passing     Parvin will speak with her brother. He is funding the Private Pay deposit for [facility]. Does not believe he will agree to discharge to home. Prefers the Pt to have 24 hour caregivers.       Next Steps: CM to follow up on Saturday with placement decision       -[facility]   RN report: [phone]   fax: [phone]    No PAS needed. Pt came to the hospital from [facility]     $5,000-- Private Pay Deposit required on Admission     -[facility]   Phone: [phone]   Fax: [phone]    Will need a BLS Ambulance Transport   Time: ________      Avoidable 11/29      PIERO Knight  [facility] [phone]   [facility]  [phone]         "

## 2024-11-29 NOTE — PROGRESS NOTES
Patient sedated. Frequently moaning. As needed medications administered per family request.  Daughters at bedside. Declined repositioning.   Small amount of urine in rausch and small amount of stool in ostomies.   Patient having difficulty swallowing and is gurgling.
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
## 2024-11-29 NOTE — PLAN OF CARE
Problem: End-of-Life Care  Goal: Comfort, Peace and Preserved Dignity  Outcome: Progressing     Goal Outcome Evaluation: Patient is on hospice; non-verbal. Daughters are in the room. PRN Dilaudid/Ativan at family request. Informed refusal on turning patient, family will request turn. Heels elevated on pillows. Vazquez catheter, ileostomy and colostomy in place and intact. Positive radial and pedal pulses. Lower extremity edema.

## 2024-11-30 PROCEDURE — 250N000013 HC RX MED GY IP 250 OP 250 PS 637: Performed by: PHYSICIAN ASSISTANT

## 2024-11-30 PROCEDURE — 250N000013 HC RX MED GY IP 250 OP 250 PS 637: Performed by: INTERNAL MEDICINE

## 2024-11-30 PROCEDURE — 250N000011 HC RX IP 250 OP 636: Performed by: STUDENT IN AN ORGANIZED HEALTH CARE EDUCATION/TRAINING PROGRAM

## 2024-11-30 PROCEDURE — 99238 HOSP IP/OBS DSCHRG MGMT 30/<: CPT | Mod: GV | Performed by: INTERNAL MEDICINE

## 2024-11-30 RX ADMIN — MORPHINE SULFATE 10 MG: 20 SOLUTION ORAL at 00:37

## 2024-11-30 RX ADMIN — ATROPINE SULFATE 2 DROP: 10 SOLUTION/ DROPS OPHTHALMIC at 02:50

## 2024-11-30 RX ADMIN — MORPHINE SULFATE 10 MG: 20 SOLUTION ORAL at 02:50

## 2024-11-30 RX ADMIN — LORAZEPAM 1 MG: 2 INJECTION INTRAMUSCULAR; INTRAVENOUS at 00:37

## 2024-11-30 RX ADMIN — ATROPINE SULFATE 2 DROP: 10 SOLUTION/ DROPS OPHTHALMIC at 06:44

## 2024-11-30 RX ADMIN — MORPHINE SULFATE 10 MG: 20 SOLUTION ORAL at 05:02

## 2024-11-30 RX ADMIN — LORAZEPAM 1 MG: 2 SOLUTION, CONCENTRATE ORAL at 06:44

## 2024-11-30 ASSESSMENT — ACTIVITIES OF DAILY LIVING (ADL)
ADLS_ACUITY_SCORE: 56

## 2024-11-30 NOTE — PLAN OF CARE
Problem: End-of-Life Care  Goal: Comfort, Peace and Preserved Dignity  Intervention: Promote Physical Comfort  Flowsheets  Taken 11/29/2024 2136  Airway/Ventilation Support:   comfort measures provided   other (see comments)  Taken 11/29/2024 1607  Airway/Ventilation Support: (atropine as needed)   comfort measures provided   other (see comments)     Patient appears more comfortable this evening. PRN morphine and ativan given per family request. Patient has a new rattle in her breathing and has received atropine drops for this. Skin is warm/hot to touch. Family declined any repositioning at this time. Vazquez catheter continues to drain with good output.

## 2024-11-30 NOTE — PLAN OF CARE
Goal Outcome Evaluation:  Patient with no movement, no respirations, no pulse, eyes fixed and dilated at 0710. Updated Dr. Horne at 0712.

## 2024-11-30 NOTE — DISCHARGE SUMMARY
[facility]  Hospitalist Service  Death Summary        Michelle Holguin MRN# [id number]   YOB: 1960 Age: 64 year old     Date of Admission:  2024  Date of Death:              2024  Admitting Physician:  Christian Blackman MD  Discharge Physician: Christian Blackman MD  Discharging Service: Hospitalist     Primary Provider: [facility]  Primary Care Physician Phone Number: [phone]         Cause of Death:      1.            Important Results:      none        Discharge Diagnoses/Problem Oriented Hospital Course (Providers):    Michelle Holguin was admitted on 2024 by Christian Blackman MD and I would refer you to their history and physical.  The following problems were addressed during her hospitalization:    Severe sepsis   Gram positive bacteremia   Severe hypoglycemia   Presents with altered mental status and hypoglycemia in the setting of metastatic colorectal cancer, see below. ED workup revealed labile blood glucose (28 - 110), hyponatremia (130), acute kidney injury (GFR 43), lactic acidosis (2.2), leukocytosis (21.4), Hgb 6.6, positive UA, and CT evidence of progression of known metastasis. ED provider had lengthy discussion of goals of care with patient's family and discussed patient with her Surgeon and oncologist at [facility]. Patient had been started on antibiotics and IV glucose in fluids in an attempt to temporize things to be able to get her to home. On further discussion antibiotics were stopped but D5 LR continued for a time but now stopped. Discharged and re-admitted with hospice.   [facility] hospice team discharged pt from their service. Pt has been in hospital on comfort cares.  today at 7.10 AM.        Metastatic colorectal cancer, progressing   Developed pelvic pain in 2024; pelvic US revealed a large centrally necrotic appearing lesion approximately 11 x 5 x 10 cm in size. It was inseparable from the cecum raising the possibility of  a colon/appendiceal primary. CEA was obtained and found to be elevated at 723. CA-125 was normal at 12. Repeat imaging with CT C/A/P was performed on 24 which did not show any evidence of metastatic disease. Pelvic MRI on 9/3/24 showed a large irregular thick-walled mass lesion in the pelvis, again concerning for cecal/appendiceal origin with multiple likely metastatic necrotic lymph nodes and evidence of peritoneal carcinomatosis. Admitted to [facility] 10/24/24 through 24 from the [facility] for further evaluation of a large pelvic mass with high suspicion of locoregional speard. Found to have a large cecal/ascending colon mass with fistulous communication to the uterus. RLQ fluid collection s/p IR drain 10/24/24 and removal 24. IV antibiotics initiated, but fluid cultures with no growth. Liver metastases noted on CT. Liver biopsy 10/25/24 revealed metastatic colorectal carcinoma. A diverting stoma was recommended by Colorectal Surgery due to high risk of future bowel obstruction. Found to have a subsegmental pulmonary embolism 10/25/24 with right common femoral vein and great saphenous vein DVT. A bilateral ureteral stent was placed 10/26/24 for left hydroureteronephrosis. She was discharged to TCU on 24. ED provider discussed this patients case with Oncologist Dr. Garland at [place] who confirmed primary tumor is unresectable.      DISPO  Pt   today at 7/10 AM. Family at bedside.         Image Results From This Hospital Stay (For Non-EPIC Providers):        Results for orders placed or performed during the hospital encounter of 24   XR Chest Port 1 View    Narrative    XR CHEST PORT 1 VIEW 2024 4:22 PM    HISTORY: Fever    COMPARISON: None.      Impression    IMPRESSION: Mild linear opacities in the right lung base, either  representing atelectasis or pneumonia. Trace right pleural effusion.  No pneumothorax. Normal heart size.    BRIAN DELANEY MD          SYSTEM ID:  [id number]   CT Abdomen Pelvis w Contrast    Narrative    CT ABDOMEN PELVIS W CONTRAST 11/22/2024 3:53 PM    CLINICAL HISTORY: 64F, known metastatic colorectal cancer recently s/p  bilateral colostomy placement, here with altered mental status, fever,  generalized abdominal tenderness    TECHNIQUE: CT scan of the abdomen and pelvis was performed following  injection of IV contrast. Multiplanar reformats were obtained. Dose  reduction techniques were used.  CONTRAST: 70 ml isovue 370    COMPARISON: CT abdomen and pelvis performed on 10/10/2024    FINDINGS:   LOWER CHEST: Small bilateral pleural effusions are present with  subadjacent subsegmental atelectasis.    HEPATOBILIARY: Multiple new and enlarging hypoattenuating lesions are  present in the liver including a new 1.7 cm lesion in the right  hepatic lobe (series 2, image 8). Another example includes a 1.3 cm  lesion along the lateral right hepatic lobe that previously measured  0.6 cm (series 2, image 21). Liver is mildly enlarged measuring up to  18 cm in craniocaudal length. Gallbladder appears unremarkable.    PANCREAS: No significant mass, duct dilatation, or inflammatory  change.    SPLEEN: Normal size.    ADRENAL GLANDS: No significant nodules.    KIDNEYS/BLADDER: Severe bilateral hydroureteronephrosis is present.  Bilateral ureteral stents are seen with proximal tips in the renal  pelvis and distal tips in the urinary bladder. Distal portions of both  ureters appear to be encased by the cecal/ascending colonic mass in  the pelvis. Stable exophytic hyperdense lesion along the lower pole of  the left kidney measuring up to 1.6 cm. Minimally complex 1.5 cm  cystic lesion is noted along the upper pole of the left kidney with  thin internal septation. See below for description of bladder  infiltration by the colonic mass described below. 0.8 cm enhancing  soft tissue nodule is noted along the bladder dome and was not seen  previously. 14 mm  calculus appears unchanged within the lumen of the  urinary bladder.    BOWEL: Perforated cecal/ascending colonic mass and appears increased  in size since the prior exam, measuring up to 15.6 x 13.4 cm compared  with the prior measurement of approximately 12.6 x 6.6 cm (series 2,  image 64). There is adjacent invasion into the fistulous communication  with the uterus which demonstrates internal gas. Additional segmental  wall thickening is noted along the rectosigmoid colon and ileum which  appear to be encased by the mass. The mass also infiltrates along the  posterior and lateral walls of the urinary bladder.  Mild to moderate  surrounding fat stranding is noted along the anterior border of the  mass. Multiple diverticula are seen along the sigmoid colon with  adjacent fat stranding and associated wall thickening suggestive of  diverticulitis. Similar findings were also seen on the prior exam.    PELVIC ORGANS: Peripherally enhancing fluid collection along the right  pelvis appears decreased in size and measures approximately 4.4 x 5.3  cm, previously measuring up to 15.4 x 13.2 cm (series 2, image 62).  Loculated fluid is noted posteriorly in the pelvis anterior to the  rectum which also appears decreased in size, measuring 8.9 x 6.2 cm  compared with the prior measurement of 8.8 x 9.9 cm. Masslike soft  tissue thickening along the bilateral adnexal regions appears more  prominent measuring up to 5.6 x 3.4 cm compared with the prior  measurement of 3.5 x 3.4 cm on the right side (series 2, image 66) and  3.4 x 2.6 cm compared with the prior measurement of 2.4 x 2.7 cm on  the left side (series 2, image 63).    ADDITIONAL FINDINGS: Enlarging lymphadenopathy is present throughout  the abdomen and pelvis. For example, one of the largest lymph nodes  measures up to 2.2 cm along the left para-aortic space, previously  measuring up to 0.9 cm (series 2, image 39). Another example includes  a 2.0 cm lymph node along the  left external iliac chain that  previously measured 1.1 cm (series 2, image 73). Mild intra-abdominal  ascites is seen. Ostomies are noted bilaterally along the left and  right abdominal walls. Nodular peritoneal thickening also appears more  prominent since the prior study with peritoneal thickness measuring up  to 9 mm along the right anterior pelvis (series 2, image 60).    MUSCULOSKELETAL: Multilevel degenerative changes are present in the  spine. Surgical hardware is again seen within the right femur. Stable  punctate sclerotic focus at the L5 vertebral level, suspected to  reflect a bone island. Subcutaneous edema noted along the bilateral  flank regions within the lateral abdominal walls.      Impression    IMPRESSION:   1.  Enlarging perforated, cecal/ascending colonic mass with worsening  invasion and fistulous communication with the uterus. There is also  more prominent indentation along the urinary bladder and bilateral  adnexal regions. Encasement of the bilateral distal ureters,  rectosigmoid colon and terminal ileum are also present which are most  likely infiltrated by the mass.  2.  Decreased size of complex fluid collections in the pelvis. These  collections have peripheral wall enhancement and could reflect  abscesses versus contained perforations.  3.  Severe bilateral hydroureteronephrosis despite presence of  bilateral ureteral stents. Findings are suspected to be related to  encasement by the bilateral ureters by the colonic mass described  above.  4.  New and enlarging hypoattenuating masses in the liver compatible  with worsening metastatic disease.  5.  Enlarging lymphadenopathy in the abdomen and pelvis compatible  with worsening metastatic disease. Nodular peritoneal thickening also  appears more conspicuous, especially within the pelvis suggestive of  peritoneal carcinomatosis.  6.  0.8 cm enhancing soft tissue nodule is noted along the bladder  dome and was not seen previously. Findings  could reflect a metastatic  implant versus primary bladder malignancy.  7.  Acute diverticulitis of the sigmoid colon, also seen on the prior  exam.  8.  Subcutaneous edema seen along the bilateral flank regions within  the lateral abdominal walls.  9.  Small bilateral pleural effusions.    BECKY VERONICA MD         SYSTEM ID:  [id number]   Head CT w/o contrast    Narrative    CT SCAN OF THE HEAD WITHOUT CONTRAST   11/22/2024 3:50 PM     HISTORY: 64F, known metastatic colorectal carcinoma, here with altered  mental status    TECHNIQUE:  Axial images of the head and coronal reformations without  IV contrast material. Radiation dose for this scan was reduced using  automated exposure control, adjustment of the mA and/or kV according  to patient size, or iterative reconstruction technique.    COMPARISON: None.    FINDINGS: There is no evidence of intracranial hemorrhage, mass, acute  infarct or anomaly. The ventricles are normal in size, shape and  configuration. Mild patchy periventricular white matter hypodensities  which are nonspecific, but likely related to chronic microvascular  ischemic disease. Partially empty sella, within normal limits for age.  Bilateral basal ganglia mineralization.    The visualized portions of the sinuses and mastoids appear normal. The  bony calvarium and bones of the skull base appear intact.       Impression    IMPRESSION:   No CT evidence of an intracranial mass. If there is  further clinical concern, contrast enhanced MRI is recommended to  exclude small metastases given increased sensitivity of MRI compared  to CT.      DANIEL GARCIA MD         SYSTEM ID:  [id number]           Most Recent Lab Results In EPIC (For Non-EPIC Providers):    Most Recent 3 CBC's:  Recent Labs   Lab Test 11/22/24  1439 11/22/24  0954 09/27/24  1013   WBC 21.4* 17.6* 12.2*   HGB 7.0* 6.6* 9.2*   MCV 74* 74* 77*   * 567* 441      Most Recent 3 BMP's:  Recent Labs   Lab Test 11/23/24  0209  "11/23/24  0002 11/22/24  2133 11/22/24  1659 11/22/24  1439 11/22/24  1429 11/22/24  0954   NA  --   --   --   --  130*  --  130*   POTASSIUM  --   --   --   --  4.3  --  4.0   CHLORIDE  --   --   --   --  98  --  98   CO2  --   --   --   --  21*  --  19*   BUN  --   --   --   --  27.5*  --  29.8*   CR  --   --   --   --  1.35*  --  1.25*   ANIONGAP  --   --   --   --  11  --  13   SHOBHA  --   --   --   --  8.8  --  8.9   * 130* 101*   < > 132*   < > 62*    < > = values in this interval not displayed.     Most Recent 3 Troponin's:No lab results found.    Invalid input(s): \"TROP\", \"TROPONINIES\"  Most Recent 3 INR's:  Recent Labs   Lab Test 11/22/24  0954 09/27/24  1013   INR 1.62* 1.05     Most Recent 2 LFT's:  Recent Labs   Lab Test 11/22/24  1439 11/22/24  0954   AST 68* 70*   ALT 33 32   ALKPHOS 247* 252*   BILITOTAL 0.7 0.4     Most Recent Cholesterol Panel:No lab results found.  Most Recent 6 Bacteria Isolates From Any Culture (See EPIC Reports for Culture Details):No lab results found.  Most Recent TSH, T4 and HgbA1c: No lab results found.     5  "

## 2024-11-30 NOTE — PROGRESS NOTES
MD DEATH PRONOUNCEMENT    Called to pronounce castrocaylaMichelle alis dead.    Physical Exam: Spontaneous respirations absent, Breath sounds absent, and Heart sounds absent    Patient was pronounced dead at 7:10 AM, 2024    Patient Active Hospital Problem List:   * No active hospital problems. *        Please consider an autopsy if any of the following exist:  NO Unexpected or unexplained death during or following any dental, medical, or surgical diagnostic treatment procedures.   NO Death of mother at or up to seven days after delivery.     NO All  and pediatric deaths.     NO Death where the cause is sufficiently obscure to delay completion of the death certificate.   NO Deaths in which autopsy would confirm a suspected illness/condition that would affect surviving family members or recipients of transplanted organs.     The following deaths must be reported to the 's Office:  NO A death that may be due entirely or in part to any factors other than natural disease (recent surgery, recent trauma, suspected abuse/neglect).   NO A death that may be an accident, suicide, or homicide.     NO Any sudden, unexpected death in which there is no prior history of significant heart disease or any other condition associated with sudden death.   NO Any death which is apparently due to natural causes but in which the  does not have a personal physician familiar with the patient s medical history, social, or environmental situation or the circumstances of the terminal event.   NO Any death apparently due to Sudden Infant Death Syndrome.     NO Deaths that occur during, in association with, or as consequences of a diagnostic, therapeutic, or anesthetic procedure.   NO Any death in which a fracture of a major bone has occurred within the past (6) six months.   NO Any death in which the  was an inmate of a public institution or was in the custody of Law Enforcement personnel.     Body disposition: Body  released to the  home.    Christian Blackman

## 2025-03-31 ENCOUNTER — TELEPHONE (OUTPATIENT)
Dept: FAMILY MEDICINE | Facility: CLINIC | Age: 65
End: 2025-03-31
Payer: COMMERCIAL

## 2025-03-31 NOTE — TELEPHONE ENCOUNTER
Simone, with [facility] in [place] called to see if a provider at the clinic would be able to sign the death certificate for patient.     Per chart review, patient most recently was a patient with [facility]. RN provided phone number for that clinic to Simone. He denied further questions or concerns.     Judy Pagan RN, BSN, PHN  [facility]

## (undated) RX ORDER — FENTANYL CITRATE 50 UG/ML
INJECTION, SOLUTION INTRAMUSCULAR; INTRAVENOUS
Status: DISPENSED
Start: 2024-09-27

## (undated) RX ORDER — LIDOCAINE HYDROCHLORIDE 10 MG/ML
INJECTION, SOLUTION EPIDURAL; INFILTRATION; INTRACAUDAL; PERINEURAL
Status: DISPENSED
Start: 2024-09-27

## (undated) RX ORDER — SODIUM CHLORIDE 9 MG/ML
INJECTION, SOLUTION INTRAVENOUS
Status: DISPENSED
Start: 2024-09-27